# Patient Record
Sex: MALE | Race: BLACK OR AFRICAN AMERICAN | NOT HISPANIC OR LATINO | Employment: UNEMPLOYED | URBAN - METROPOLITAN AREA
[De-identification: names, ages, dates, MRNs, and addresses within clinical notes are randomized per-mention and may not be internally consistent; named-entity substitution may affect disease eponyms.]

---

## 2020-02-13 ENCOUNTER — HOSPITAL ENCOUNTER (EMERGENCY)
Facility: HOSPITAL | Age: 35
Discharge: HOME | End: 2020-02-13
Attending: EMERGENCY MEDICINE

## 2020-02-13 VITALS
HEIGHT: 67 IN | HEART RATE: 109 BPM | TEMPERATURE: 98.2 F | SYSTOLIC BLOOD PRESSURE: 142 MMHG | RESPIRATION RATE: 20 BRPM | DIASTOLIC BLOOD PRESSURE: 84 MMHG | OXYGEN SATURATION: 97 % | WEIGHT: 226.6 LBS | BODY MASS INDEX: 35.56 KG/M2

## 2020-02-13 DIAGNOSIS — K04.7 DENTAL ABSCESS: Primary | ICD-10-CM

## 2020-02-13 PROCEDURE — 99283 EMERGENCY DEPT VISIT LOW MDM: CPT

## 2020-02-13 PROCEDURE — 99283 EMERGENCY DEPT VISIT LOW MDM: CPT | Performed by: EMERGENCY MEDICINE

## 2020-02-13 RX ORDER — OXYCODONE HYDROCHLORIDE AND ACETAMINOPHEN 5; 325 MG/1; MG/1
1 TABLET ORAL EVERY 6 HOURS PRN
Qty: 10 TABLET | Refills: 0 | Status: SHIPPED | OUTPATIENT
Start: 2020-02-13 | End: 2020-02-23

## 2020-02-13 RX ORDER — NAPROXEN 500 MG/1
1 TABLET ORAL 2 TIMES DAILY
COMMUNITY
End: 2021-01-07 | Stop reason: HOSPADM

## 2020-02-13 RX ORDER — CLINDAMYCIN HYDROCHLORIDE 150 MG/1
300 CAPSULE ORAL ONCE
Status: COMPLETED | OUTPATIENT
Start: 2020-02-13 | End: 2020-02-13

## 2020-02-13 RX ORDER — OXYCODONE HYDROCHLORIDE AND ACETAMINOPHEN 5; 325 MG/1; MG/1
1 TABLET ORAL ONCE
Status: COMPLETED | OUTPATIENT
Start: 2020-02-13 | End: 2020-02-13

## 2020-02-13 RX ORDER — CLINDAMYCIN HYDROCHLORIDE 300 MG/1
300 CAPSULE ORAL EVERY 6 HOURS
Qty: 28 CAPSULE | Refills: 0 | Status: SHIPPED | OUTPATIENT
Start: 2020-02-13 | End: 2020-02-20

## 2020-02-13 RX ORDER — ALBUTEROL SULFATE 90 UG/1
2 AEROSOL, METERED RESPIRATORY (INHALATION) EVERY 4 HOURS PRN
COMMUNITY

## 2020-02-13 RX ADMIN — CLINDAMYCIN HYDROCHLORIDE 300 MG: 150 CAPSULE ORAL at 16:24

## 2020-02-13 RX ADMIN — OXYCODONE HYDROCHLORIDE AND ACETAMINOPHEN 1 TABLET: 5; 325 TABLET ORAL at 16:24

## 2020-02-13 NOTE — ED PROVIDER NOTES
History  Chief Complaint   Patient presents with    Dental Problem     States he started 2/10 with pain and swelling left upper tooth  States had fever and chills last night  Took Naprosyn this am , Tylenol this am       28 yo male c/o worsening left upper gum infection x 2 days  Has been using over the counter tylenol, naprosyn and saline syringe and ear drops  Pain radiates into left ear  Did have a fever last night  No vomiting or diarrhea   + smoker      History provided by:  Patient   used: No        Prior to Admission Medications   Prescriptions Last Dose Informant Patient Reported? Taking? albuterol (PROVENTIL HFA,VENTOLIN HFA) 90 mcg/act inhaler Unknown at Unknown time  Yes No   Sig: Inhale 2 puffs every 4 (four) hours as needed   naproxen (NAPROSYN) 500 mg tablet 2/13/2020 at 0900  Yes Yes   Sig: Take 1 tablet by mouth 2 (two) times a day      Facility-Administered Medications: None       Past Medical History:   Diagnosis Date    Asthma        Past Surgical History:   Procedure Laterality Date    JOINT REPLACEMENT Right     knee    KNEE SURGERY      SHOULDER SURGERY Right        History reviewed  No pertinent family history  I have reviewed and agree with the history as documented  Social History     Tobacco Use    Smoking status: Current Every Day Smoker     Packs/day: 0 50     Types: Cigarettes    Smokeless tobacco: Never Used   Substance Use Topics    Alcohol use: Not Currently    Drug use: Never       Review of Systems   Constitutional: Positive for fever  HENT: Positive for dental problem  Gastrointestinal: Negative for diarrhea and vomiting  Physical Exam  Physical Exam   Constitutional: He is oriented to person, place, and time  He appears well-developed and well-nourished  No distress  HENT:   Head: Normocephalic and atraumatic  Mild left upper cheek sts, + fluctuance seen and palpable upper gum above decayed tooth nubs    Area aspirated with 18 gauge needle and about 1 5 cc pus expelled   Eyes: No scleral icterus  Neck: Normal range of motion  Neck supple  Cardiovascular: Normal rate, regular rhythm and normal heart sounds  Pulmonary/Chest: Effort normal and breath sounds normal    Musculoskeletal: Normal range of motion  Neurological: He is alert and oriented to person, place, and time  He exhibits normal muscle tone  Skin: Skin is warm and dry  He is not diaphoretic  Psychiatric: He has a normal mood and affect  His behavior is normal    Nursing note and vitals reviewed  Vital Signs  ED Triage Vitals [02/13/20 1605]   Temperature Pulse Respirations Blood Pressure SpO2   98 2 °F (36 8 °C) (!) 109 20 142/84 97 %      Temp Source Heart Rate Source Patient Position - Orthostatic VS BP Location FiO2 (%)   Tympanic Monitor Sitting Left arm --      Pain Score       6           Vitals:    02/13/20 1605   BP: 142/84   Pulse: (!) 109   Patient Position - Orthostatic VS: Sitting         Visual Acuity      ED Medications  Medications   clindamycin (CLEOCIN) capsule 300 mg (300 mg Oral Given 2/13/20 1624)   oxyCODONE-acetaminophen (PERCOCET) 5-325 mg per tablet 1 tablet (1 tablet Oral Given 2/13/20 1624)       Diagnostic Studies  Results Reviewed     None                 No orders to display              Procedures  Procedures         ED Course                               MDM  Number of Diagnoses or Management Options  Dental abscess:   Diagnosis management comments: Pt  Advised he needs to do warm salt water rinses, oral abx, no smoking and see dentist asap          Disposition  Final diagnoses:   Dental abscess     Time reflects when diagnosis was documented in both MDM as applicable and the Disposition within this note     Time User Action Codes Description Comment    8/83/2561  3:34 PM Yessenia Aaron Add [E76 8] Dental abscess       ED Disposition     ED Disposition Condition Date/Time Comment    Discharge Stable u Feb 13, 2020  4:17 PM [facility] discharge to home/self care  Follow-up Information     Follow up With Specialties Details Why Contact Info    any dentist  Schedule an appointment as soon as possible for a visit  as soon as possible           Patient's Medications   Discharge Prescriptions    CLINDAMYCIN (CLEOCIN) 300 MG CAPSULE    Take 1 capsule (300 mg total) by mouth every 6 (six) hours for 7 days       Start Date: 2/13/2020 End Date: 2/20/2020       Order Dose: 300 mg       Quantity: 28 capsule    Refills: 0    OXYCODONE-ACETAMINOPHEN (PERCOCET) 5-325 MG PER TABLET    Take 1 tablet by mouth every 6 (six) hours as needed for moderate pain for up to 10 daysMax Daily Amount: 4 tablets       Start Date: 2/13/2020 End Date: 2/23/2020       Order Dose: 1 tablet       Quantity: 10 tablet    Refills: 0     No discharge procedures on file      PDMP Review     None          ED Provider  Electronically Signed by           Merari Buck MD  07/67/22 0175

## 2020-11-29 ENCOUNTER — HOSPITAL ENCOUNTER (INPATIENT)
Facility: HOSPITAL | Age: 35
LOS: 1 days | Discharge: HOME | DRG: 446 | End: 2020-12-02
Attending: EMERGENCY MEDICINE | Admitting: FAMILY MEDICINE
Payer: COMMERCIAL

## 2020-11-29 ENCOUNTER — APPOINTMENT (EMERGENCY)
Dept: RADIOLOGY | Facility: HOSPITAL | Age: 35
DRG: 446 | End: 2020-11-29
Payer: COMMERCIAL

## 2020-11-29 DIAGNOSIS — N39.0 UTI (URINARY TRACT INFECTION): Primary | ICD-10-CM

## 2020-11-29 DIAGNOSIS — F17.200 TOBACCO DEPENDENCE: ICD-10-CM

## 2020-11-29 DIAGNOSIS — N20.1 CALCULUS OF URETER: ICD-10-CM

## 2020-11-29 DIAGNOSIS — N20.1 URETERAL STONE: ICD-10-CM

## 2020-11-29 DIAGNOSIS — N20.0 KIDNEY STONE: ICD-10-CM

## 2020-11-29 PROBLEM — N30.90 CYSTITIS: Status: ACTIVE | Noted: 2020-11-29

## 2020-11-29 PROBLEM — E87.6 HYPOKALEMIA: Status: ACTIVE | Noted: 2020-11-29

## 2020-11-29 LAB
ALBUMIN SERPL BCP-MCNC: 3.7 G/DL (ref 3.5–5)
ALP SERPL-CCNC: 77 U/L (ref 46–116)
ALT SERPL W P-5'-P-CCNC: 31 U/L (ref 12–78)
ANION GAP SERPL CALCULATED.3IONS-SCNC: 10 MMOL/L (ref 4–13)
APTT PPP: 31 SECONDS (ref 23–37)
AST SERPL W P-5'-P-CCNC: 23 U/L (ref 5–45)
BACTERIA UR QL AUTO: ABNORMAL /HPF
BASOPHILS # BLD AUTO: 0.03 THOUSANDS/ΜL (ref 0–0.1)
BASOPHILS NFR BLD AUTO: 0 % (ref 0–1)
BILIRUB SERPL-MCNC: 0.2 MG/DL (ref 0.2–1)
BILIRUB UR QL STRIP: ABNORMAL
BUN SERPL-MCNC: 12 MG/DL (ref 5–25)
CALCIUM SERPL-MCNC: 8.3 MG/DL (ref 8.3–10.1)
CHLORIDE SERPL-SCNC: 105 MMOL/L (ref 100–108)
CLARITY UR: ABNORMAL
CO2 SERPL-SCNC: 27 MMOL/L (ref 21–32)
COLOR UR: ABNORMAL
CREAT SERPL-MCNC: 1.3 MG/DL (ref 0.6–1.3)
EOSINOPHIL # BLD AUTO: 0.23 THOUSAND/ΜL (ref 0–0.61)
EOSINOPHIL NFR BLD AUTO: 2 % (ref 0–6)
ERYTHROCYTE [DISTWIDTH] IN BLOOD BY AUTOMATED COUNT: 11.7 % (ref 11.6–15.1)
GFR SERPL CREATININE-BSD FRML MDRD: 81 ML/MIN/1.73SQ M
GLUCOSE SERPL-MCNC: 115 MG/DL (ref 65–140)
GLUCOSE UR STRIP-MCNC: NEGATIVE MG/DL
HCT VFR BLD AUTO: 40.9 % (ref 36.5–49.3)
HGB BLD-MCNC: 13.1 G/DL (ref 12–17)
HGB UR QL STRIP.AUTO: ABNORMAL
IMM GRANULOCYTES # BLD AUTO: 0.03 THOUSAND/UL (ref 0–0.2)
IMM GRANULOCYTES NFR BLD AUTO: 0 % (ref 0–2)
INR PPP: 1.03 (ref 0.84–1.19)
KETONES UR STRIP-MCNC: ABNORMAL MG/DL
LEUKOCYTE ESTERASE UR QL STRIP: NEGATIVE
LYMPHOCYTES # BLD AUTO: 3.75 THOUSANDS/ΜL (ref 0.6–4.47)
LYMPHOCYTES NFR BLD AUTO: 33 % (ref 14–44)
MCH RBC QN AUTO: 29.8 PG (ref 26.8–34.3)
MCHC RBC AUTO-ENTMCNC: 32 G/DL (ref 31.4–37.4)
MCV RBC AUTO: 93 FL (ref 82–98)
MONOCYTES # BLD AUTO: 0.96 THOUSAND/ΜL (ref 0.17–1.22)
MONOCYTES NFR BLD AUTO: 8 % (ref 4–12)
NEUTROPHILS # BLD AUTO: 6.53 THOUSANDS/ΜL (ref 1.85–7.62)
NEUTS SEG NFR BLD AUTO: 57 % (ref 43–75)
NITRITE UR QL STRIP: POSITIVE
NON-SQ EPI CELLS URNS QL MICRO: ABNORMAL /HPF
NRBC BLD AUTO-RTO: 0 /100 WBCS
PH UR STRIP.AUTO: 6.5 [PH]
PLATELET # BLD AUTO: 282 THOUSANDS/UL (ref 149–390)
PMV BLD AUTO: 10.5 FL (ref 8.9–12.7)
POTASSIUM SERPL-SCNC: 3.2 MMOL/L (ref 3.5–5.3)
PROT SERPL-MCNC: 7.1 G/DL (ref 6.4–8.2)
PROT UR STRIP-MCNC: ABNORMAL MG/DL
PROTHROMBIN TIME: 13.4 SECONDS (ref 11.6–14.5)
RBC # BLD AUTO: 4.39 MILLION/UL (ref 3.88–5.62)
RBC #/AREA URNS AUTO: ABNORMAL /HPF
SODIUM SERPL-SCNC: 142 MMOL/L (ref 136–145)
SP GR UR STRIP.AUTO: 1.02 (ref 1–1.03)
UROBILINOGEN UR QL STRIP.AUTO: 1 E.U./DL
WBC # BLD AUTO: 11.53 THOUSAND/UL (ref 4.31–10.16)
WBC #/AREA URNS AUTO: ABNORMAL /HPF

## 2020-11-29 PROCEDURE — 36415 COLL VENOUS BLD VENIPUNCTURE: CPT | Performed by: EMERGENCY MEDICINE

## 2020-11-29 PROCEDURE — 96375 TX/PRO/DX INJ NEW DRUG ADDON: CPT

## 2020-11-29 PROCEDURE — G1004 CDSM NDSC: HCPCS

## 2020-11-29 PROCEDURE — 74177 CT ABD & PELVIS W/CONTRAST: CPT

## 2020-11-29 PROCEDURE — 99285 EMERGENCY DEPT VISIT HI MDM: CPT | Performed by: EMERGENCY MEDICINE

## 2020-11-29 PROCEDURE — 85730 THROMBOPLASTIN TIME PARTIAL: CPT | Performed by: EMERGENCY MEDICINE

## 2020-11-29 PROCEDURE — 85610 PROTHROMBIN TIME: CPT | Performed by: EMERGENCY MEDICINE

## 2020-11-29 PROCEDURE — 99285 EMERGENCY DEPT VISIT HI MDM: CPT

## 2020-11-29 PROCEDURE — 80053 COMPREHEN METABOLIC PANEL: CPT | Performed by: EMERGENCY MEDICINE

## 2020-11-29 PROCEDURE — 96376 TX/PRO/DX INJ SAME DRUG ADON: CPT

## 2020-11-29 PROCEDURE — 81001 URINALYSIS AUTO W/SCOPE: CPT | Performed by: EMERGENCY MEDICINE

## 2020-11-29 PROCEDURE — 99220 PR INITIAL OBSERVATION CARE/DAY 70 MINUTES: CPT | Performed by: NURSE PRACTITIONER

## 2020-11-29 PROCEDURE — 96374 THER/PROPH/DIAG INJ IV PUSH: CPT

## 2020-11-29 PROCEDURE — 85025 COMPLETE CBC W/AUTO DIFF WBC: CPT | Performed by: EMERGENCY MEDICINE

## 2020-11-29 PROCEDURE — 0241U HB NFCT DS VIR RESP RNA 4 TRGT: CPT | Performed by: NURSE PRACTITIONER

## 2020-11-29 PROCEDURE — 96361 HYDRATE IV INFUSION ADD-ON: CPT

## 2020-11-29 PROCEDURE — 87086 URINE CULTURE/COLONY COUNT: CPT | Performed by: EMERGENCY MEDICINE

## 2020-11-29 RX ORDER — HYDROMORPHONE HCL/PF 1 MG/ML
0.5 SYRINGE (ML) INJECTION ONCE
Status: COMPLETED | OUTPATIENT
Start: 2020-11-29 | End: 2020-11-29

## 2020-11-29 RX ORDER — CEFTRIAXONE 1 G/50ML
1000 INJECTION, SOLUTION INTRAVENOUS ONCE
Status: COMPLETED | OUTPATIENT
Start: 2020-11-29 | End: 2020-11-30

## 2020-11-29 RX ORDER — KETOROLAC TROMETHAMINE 30 MG/ML
15 INJECTION, SOLUTION INTRAMUSCULAR; INTRAVENOUS ONCE
Status: COMPLETED | OUTPATIENT
Start: 2020-11-29 | End: 2020-11-29

## 2020-11-29 RX ORDER — HYDROMORPHONE HCL/PF 1 MG/ML
1 SYRINGE (ML) INJECTION ONCE
Status: COMPLETED | OUTPATIENT
Start: 2020-11-29 | End: 2020-11-29

## 2020-11-29 RX ADMIN — IOHEXOL 100 ML: 350 INJECTION, SOLUTION INTRAVENOUS at 22:01

## 2020-11-29 RX ADMIN — HYDROMORPHONE HYDROCHLORIDE 0.5 MG: 1 INJECTION, SOLUTION INTRAMUSCULAR; INTRAVENOUS; SUBCUTANEOUS at 23:35

## 2020-11-29 RX ADMIN — CEFTRIAXONE 1000 MG: 1 INJECTION, SOLUTION INTRAVENOUS at 23:30

## 2020-11-29 RX ADMIN — KETOROLAC TROMETHAMINE 15 MG: 30 INJECTION, SOLUTION INTRAMUSCULAR at 21:18

## 2020-11-29 RX ADMIN — SODIUM CHLORIDE 1000 ML: 0.9 INJECTION, SOLUTION INTRAVENOUS at 21:14

## 2020-11-29 RX ADMIN — HYDROMORPHONE HYDROCHLORIDE 1 MG: 1 INJECTION, SOLUTION INTRAMUSCULAR; INTRAVENOUS; SUBCUTANEOUS at 21:46

## 2020-11-29 RX ADMIN — HYDROMORPHONE HYDROCHLORIDE 0.5 MG: 1 INJECTION, SOLUTION INTRAMUSCULAR; INTRAVENOUS; SUBCUTANEOUS at 22:16

## 2020-11-30 PROBLEM — F17.200 TOBACCO DEPENDENCE: Status: ACTIVE | Noted: 2020-11-30

## 2020-11-30 LAB
ALBUMIN SERPL BCP-MCNC: 3.1 G/DL (ref 3.5–5)
ALP SERPL-CCNC: 66 U/L (ref 46–116)
ALT SERPL W P-5'-P-CCNC: 25 U/L (ref 12–78)
ANION GAP SERPL CALCULATED.3IONS-SCNC: 6 MMOL/L (ref 4–13)
AST SERPL W P-5'-P-CCNC: 16 U/L (ref 5–45)
BILIRUB SERPL-MCNC: 0.3 MG/DL (ref 0.2–1)
BUN SERPL-MCNC: 11 MG/DL (ref 5–25)
CALCIUM ALBUM COR SERPL-MCNC: 8.6 MG/DL (ref 8.3–10.1)
CALCIUM SERPL-MCNC: 7.9 MG/DL (ref 8.3–10.1)
CHLORIDE SERPL-SCNC: 108 MMOL/L (ref 100–108)
CO2 SERPL-SCNC: 27 MMOL/L (ref 21–32)
CREAT SERPL-MCNC: 1.02 MG/DL (ref 0.6–1.3)
ERYTHROCYTE [DISTWIDTH] IN BLOOD BY AUTOMATED COUNT: 11.9 % (ref 11.6–15.1)
FLUAV RNA RESP QL NAA+PROBE: NEGATIVE
FLUBV RNA RESP QL NAA+PROBE: NEGATIVE
GFR SERPL CREATININE-BSD FRML MDRD: 109 ML/MIN/1.73SQ M
GLUCOSE SERPL-MCNC: 96 MG/DL (ref 65–140)
HCT VFR BLD AUTO: 38.6 % (ref 36.5–49.3)
HGB BLD-MCNC: 11.9 G/DL (ref 12–17)
MCH RBC QN AUTO: 29.4 PG (ref 26.8–34.3)
MCHC RBC AUTO-ENTMCNC: 30.8 G/DL (ref 31.4–37.4)
MCV RBC AUTO: 95 FL (ref 82–98)
PLATELET # BLD AUTO: 256 THOUSANDS/UL (ref 149–390)
PMV BLD AUTO: 11 FL (ref 8.9–12.7)
POTASSIUM SERPL-SCNC: 3.4 MMOL/L (ref 3.5–5.3)
PROT SERPL-MCNC: 6.4 G/DL (ref 6.4–8.2)
RBC # BLD AUTO: 4.05 MILLION/UL (ref 3.88–5.62)
RSV RNA RESP QL NAA+PROBE: NEGATIVE
SARS-COV-2 RNA RESP QL NAA+PROBE: NEGATIVE
SODIUM SERPL-SCNC: 141 MMOL/L (ref 136–145)
WBC # BLD AUTO: 10.38 THOUSAND/UL (ref 4.31–10.16)

## 2020-11-30 PROCEDURE — 94640 AIRWAY INHALATION TREATMENT: CPT

## 2020-11-30 PROCEDURE — 99226 PR SBSQ OBSERVATION CARE/DAY 35 MINUTES: CPT | Performed by: INTERNAL MEDICINE

## 2020-11-30 PROCEDURE — 90686 IIV4 VACC NO PRSV 0.5 ML IM: CPT | Performed by: INTERNAL MEDICINE

## 2020-11-30 PROCEDURE — 94760 N-INVAS EAR/PLS OXIMETRY 1: CPT

## 2020-11-30 PROCEDURE — 90471 IMMUNIZATION ADMIN: CPT | Performed by: INTERNAL MEDICINE

## 2020-11-30 PROCEDURE — 85027 COMPLETE CBC AUTOMATED: CPT | Performed by: NURSE PRACTITIONER

## 2020-11-30 PROCEDURE — 94664 DEMO&/EVAL PT USE INHALER: CPT

## 2020-11-30 PROCEDURE — 80053 COMPREHEN METABOLIC PANEL: CPT | Performed by: NURSE PRACTITIONER

## 2020-11-30 RX ORDER — ONDANSETRON 2 MG/ML
4 INJECTION INTRAMUSCULAR; INTRAVENOUS EVERY 6 HOURS PRN
Status: DISCONTINUED | OUTPATIENT
Start: 2020-11-30 | End: 2020-12-02 | Stop reason: HOSPADM

## 2020-11-30 RX ORDER — ACETAMINOPHEN 325 MG/1
650 TABLET ORAL EVERY 6 HOURS PRN
Status: DISCONTINUED | OUTPATIENT
Start: 2020-11-30 | End: 2020-12-02 | Stop reason: HOSPADM

## 2020-11-30 RX ORDER — HYDROMORPHONE HCL/PF 1 MG/ML
0.5 SYRINGE (ML) INJECTION EVERY 6 HOURS PRN
Status: DISCONTINUED | OUTPATIENT
Start: 2020-11-30 | End: 2020-12-02 | Stop reason: HOSPADM

## 2020-11-30 RX ORDER — NICOTINE 21 MG/24HR
1 PATCH, TRANSDERMAL 24 HOURS TRANSDERMAL DAILY
Status: DISCONTINUED | OUTPATIENT
Start: 2020-11-30 | End: 2020-12-02 | Stop reason: HOSPADM

## 2020-11-30 RX ORDER — HYDROMORPHONE HCL/PF 1 MG/ML
0.5 SYRINGE (ML) INJECTION ONCE
Status: COMPLETED | OUTPATIENT
Start: 2020-11-30 | End: 2020-11-30

## 2020-11-30 RX ORDER — DIPHENHYDRAMINE HCL 25 MG
25 TABLET ORAL EVERY 6 HOURS PRN
Status: DISCONTINUED | OUTPATIENT
Start: 2020-11-30 | End: 2020-12-02 | Stop reason: HOSPADM

## 2020-11-30 RX ORDER — HYDROMORPHONE HCL/PF 1 MG/ML
0.2 SYRINGE (ML) INJECTION EVERY 6 HOURS PRN
Status: DISCONTINUED | OUTPATIENT
Start: 2020-11-30 | End: 2020-11-30

## 2020-11-30 RX ORDER — POTASSIUM CHLORIDE 20 MEQ/1
40 TABLET, EXTENDED RELEASE ORAL ONCE
Status: COMPLETED | OUTPATIENT
Start: 2020-11-30 | End: 2020-11-30

## 2020-11-30 RX ORDER — KETOROLAC TROMETHAMINE 30 MG/ML
15 INJECTION, SOLUTION INTRAMUSCULAR; INTRAVENOUS EVERY 6 HOURS PRN
Status: DISPENSED | OUTPATIENT
Start: 2020-11-30 | End: 2020-12-02

## 2020-11-30 RX ORDER — HYDROMORPHONE HCL/PF 1 MG/ML
0.5 SYRINGE (ML) INJECTION
Status: DISCONTINUED | OUTPATIENT
Start: 2020-11-30 | End: 2020-12-02 | Stop reason: HOSPADM

## 2020-11-30 RX ORDER — SODIUM CHLORIDE 9 MG/ML
100 INJECTION, SOLUTION INTRAVENOUS CONTINUOUS
Status: DISCONTINUED | OUTPATIENT
Start: 2020-11-30 | End: 2020-12-02 | Stop reason: HOSPADM

## 2020-11-30 RX ORDER — TAMSULOSIN HYDROCHLORIDE 0.4 MG/1
0.4 CAPSULE ORAL
Status: DISCONTINUED | OUTPATIENT
Start: 2020-11-30 | End: 2020-12-02 | Stop reason: HOSPADM

## 2020-11-30 RX ORDER — ALBUTEROL SULFATE 2.5 MG/3ML
2.5 SOLUTION RESPIRATORY (INHALATION) EVERY 6 HOURS PRN
Status: DISCONTINUED | OUTPATIENT
Start: 2020-11-30 | End: 2020-12-02 | Stop reason: HOSPADM

## 2020-11-30 RX ORDER — SODIUM CHLORIDE 9 MG/ML
125 INJECTION, SOLUTION INTRAVENOUS CONTINUOUS
Status: DISCONTINUED | OUTPATIENT
Start: 2020-11-30 | End: 2020-11-30

## 2020-11-30 RX ORDER — CEFTRIAXONE 1 G/50ML
1000 INJECTION, SOLUTION INTRAVENOUS EVERY 24 HOURS
Status: DISCONTINUED | OUTPATIENT
Start: 2020-11-30 | End: 2020-11-30

## 2020-11-30 RX ADMIN — Medication 1 PATCH: at 10:45

## 2020-11-30 RX ADMIN — SODIUM CHLORIDE 200 ML/HR: 0.9 INJECTION, SOLUTION INTRAVENOUS at 16:44

## 2020-11-30 RX ADMIN — HYDROMORPHONE HYDROCHLORIDE 0.5 MG: 1 INJECTION, SOLUTION INTRAMUSCULAR; INTRAVENOUS; SUBCUTANEOUS at 13:36

## 2020-11-30 RX ADMIN — SODIUM CHLORIDE 200 ML/HR: 0.9 INJECTION, SOLUTION INTRAVENOUS at 20:27

## 2020-11-30 RX ADMIN — DIPHENHYDRAMINE HYDROCHLORIDE 25 MG: 25 TABLET ORAL at 20:31

## 2020-11-30 RX ADMIN — TAMSULOSIN HYDROCHLORIDE 0.4 MG: 0.4 CAPSULE ORAL at 16:39

## 2020-11-30 RX ADMIN — INFLUENZA VIRUS VACCINE 0.5 ML: 15; 15; 15; 15 SUSPENSION INTRAMUSCULAR at 11:14

## 2020-11-30 RX ADMIN — DIPHENHYDRAMINE HYDROCHLORIDE 25 MG: 25 TABLET ORAL at 01:19

## 2020-11-30 RX ADMIN — HYDROMORPHONE HYDROCHLORIDE 0.5 MG: 1 INJECTION, SOLUTION INTRAMUSCULAR; INTRAVENOUS; SUBCUTANEOUS at 20:21

## 2020-11-30 RX ADMIN — DIPHENHYDRAMINE HYDROCHLORIDE 25 MG: 25 TABLET ORAL at 07:46

## 2020-11-30 RX ADMIN — POTASSIUM CHLORIDE 40 MEQ: 1500 TABLET, EXTENDED RELEASE ORAL at 20:21

## 2020-11-30 RX ADMIN — HYDROMORPHONE HYDROCHLORIDE 0.2 MG: 1 INJECTION, SOLUTION INTRAMUSCULAR; INTRAVENOUS; SUBCUTANEOUS at 09:50

## 2020-11-30 RX ADMIN — HYDROMORPHONE HYDROCHLORIDE 0.2 MG: 1 INJECTION, SOLUTION INTRAMUSCULAR; INTRAVENOUS; SUBCUTANEOUS at 03:35

## 2020-11-30 RX ADMIN — KETOROLAC TROMETHAMINE 15 MG: 30 INJECTION, SOLUTION INTRAMUSCULAR at 11:11

## 2020-11-30 RX ADMIN — SODIUM CHLORIDE 125 ML/HR: 0.9 INJECTION, SOLUTION INTRAVENOUS at 01:37

## 2020-11-30 RX ADMIN — POTASSIUM CHLORIDE 40 MEQ: 1500 TABLET, EXTENDED RELEASE ORAL at 01:19

## 2020-11-30 RX ADMIN — HYDROMORPHONE HYDROCHLORIDE 0.5 MG: 1 INJECTION, SOLUTION INTRAMUSCULAR; INTRAVENOUS; SUBCUTANEOUS at 16:39

## 2020-11-30 RX ADMIN — MORPHINE SULFATE 2 MG: 2 INJECTION, SOLUTION INTRAMUSCULAR; INTRAVENOUS at 07:46

## 2020-11-30 RX ADMIN — HYDROMORPHONE HYDROCHLORIDE 0.5 MG: 1 INJECTION, SOLUTION INTRAMUSCULAR; INTRAVENOUS; SUBCUTANEOUS at 04:27

## 2020-11-30 RX ADMIN — HYDROMORPHONE HYDROCHLORIDE 0.5 MG: 1 INJECTION, SOLUTION INTRAMUSCULAR; INTRAVENOUS; SUBCUTANEOUS at 23:28

## 2020-11-30 RX ADMIN — SODIUM CHLORIDE 200 ML/HR: 0.9 INJECTION, SOLUTION INTRAVENOUS at 10:44

## 2020-11-30 RX ADMIN — ALBUTEROL SULFATE 2.5 MG: 2.5 SOLUTION RESPIRATORY (INHALATION) at 21:06

## 2020-11-30 RX ADMIN — MORPHINE SULFATE 2 MG: 2 INJECTION, SOLUTION INTRAMUSCULAR; INTRAVENOUS at 03:00

## 2020-11-30 NOTE — UTILIZATION REVIEW
Initial Clinical Review  PATIENT WAS OBSERVATION STATUS 11/29/20 CONVERTED TO INPATIENT ADMISSiON 12/1/20 AS NEEDING CONTINUED STAYING FOR FLANK PAIN,PLAN FOR SURGERY IN AM ,    Admission: Date/Time/Statement:   Admission Orders (From admission, onward)     Ordered        12/01/20 1419  Inpatient Admission  Once                   Orders Placed This Encounter   Procedures    Inpatient Admission     Standing Status:   Standing     Number of Occurrences:   1     Order Specific Question:   Admitting Physician     Answer:   Chaz Moy [[id number]]     Order Specific Question:   Level of Care     Answer:   Med Surg [16]     Order Specific Question:   Estimated length of stay     Answer:   More than 2 Midnights     Order Specific Question:   Certification     Answer:   I certify that inpatient services are medically necessary for this patient for a duration of greater than two midnights  See H&P and MD Progress Notes for additional information about the patient's course of treatment  ED Arrival Information     Expected Arrival Acuity Means of Arrival Escorted By Service Admission Type    - 11/29/2020 20:34 Urgent Walk-In Self Hospitalist Urgent    Arrival Complaint    possible hernia        Chief Complaint   Patient presents with    Abdominal Pain     c/o low R groin/abd pain since thanksgiving  having hematuria today     Assessment/Plan: 38 y o  male with a PMH of asthma who presents with complaints of abdominal pain  Patient states that 3 days ago he developed right lower quadrant pain radiating into his groin  Last night he some dominoes and subsequently had some nausea and vomiting  He has had no fevers or chills  For the past 24 hours he has had hematuria and dysuria  No history of kidney stones  Pain worsened today prompting patient to come to the emergency department  In the ER patient was found to have a mild elevation in his white blood cell count as well as hypokalemia    CT revealed 3 mm obstructing stone in the distal right ureter with mild hydroureternephrosis proximal   Urinalysis revealed blood, nitrates, bacteria and CT was consistent with cystitis  Case was discussed with Urology and patient was recommended for medical admission for possible cystoscopy in the morning  UROLOGY  CONSULT  Assessment/ Plan:  Right 3 mm stone  98% chance passing  Start flomax   4  Strain  kub  Possible  OR on weds  Unable to pass pain    12/1/20 CONVERTED TO INPATIENT ADMISSION CONTINUES WITH FLANK PAIN IV DILAUDID X6 IN 24HR, IV TORADOL , CONTINUED IVF @200HR  ED Triage Vitals   Temperature Pulse Respirations Blood Pressure SpO2   11/29/20 2043 11/29/20 2045 11/29/20 2043 11/29/20 2045 11/29/20 2045   98 9 °F (37 2 °C) 103 20 166/86 99 %      Temp Source Heart Rate Source Patient Position - Orthostatic VS BP Location FiO2 (%)   11/29/20 2043 11/29/20 2045 11/29/20 2043 11/29/20 2043 --   Tympanic Monitor Sitting Right arm       Pain Score       11/29/20 2043       8          Wt Readings from Last 1 Encounters:   11/29/20 101 kg (222 lb)     Additional Vital Signs:   Pertinent Labs/Diagnostic Test Results:   Results from last 7 days   Lab Units 11/29/20  2345   SARS-COV-2  Negative     Results from last 7 days   Lab Units 12/01/20  1058 11/30/20  0502 11/29/20 2113   WBC Thousand/uL 8 32 10 38* 11 53*   HEMOGLOBIN g/dL 13 5 11 9* 13 1   HEMATOCRIT % 42 0 38 6 40 9   PLATELETS Thousands/uL 269 256 282   NEUTROS ABS Thousands/µL 5 62  --  6 53     Results from last 7 days   Lab Units 12/01/20  1058 11/30/20  0502 11/29/20  2113   SODIUM mmol/L 137 141 142   POTASSIUM mmol/L 4 2 3 4* 3 2*   CHLORIDE mmol/L 103 108 105   CO2 mmol/L 28 27 27   ANION GAP mmol/L 6 6 10   BUN mg/dL 9 11 12   CREATININE mg/dL 1 06 1 02 1 30   EGFR ml/min/1 73sq m 104 109 81   CALCIUM mg/dL 8 5 7 9* 8 3   MAGNESIUM mg/dL 1 8  --   --      Results from last 7 days   Lab Units 11/30/20  0502 11/29/20  2113   AST U/L 16 23   ALT U/L 25 31   ALK PHOS U/L 66 77   TOTAL PROTEIN g/dL 6 4 7 1   ALBUMIN g/dL 3 1* 3 7   TOTAL BILIRUBIN mg/dL 0 30 0 20     Results from last 7 days   Lab Units 12/01/20  1058 11/30/20  0502 11/29/20  2113   GLUCOSE RANDOM mg/dL 102 96 115     Results from last 7 days   Lab Units 11/29/20  2113   PROTIME seconds 13 4   INR  1 03   PTT seconds 31     Results from last 7 days   Lab Units 11/29/20  2148   CLARITY UA  Other   COLOR UA  Red   SPEC GRAV UA  1 025   PH UA  6 5   GLUCOSE UA mg/dl Negative   KETONES UA mg/dl Trace*   BLOOD UA  Large*   PROTEIN UA mg/dl 100 (2+)*   NITRITE UA  Positive*   BILIRUBIN UA  Interference- unable to analyze*   UROBILINOGEN UA E U /dl 1 0   LEUKOCYTES UA  Negative   WBC UA /hpf Field obscured, unable to enumerate*   RBC UA /hpf Innumerable*   BACTERIA UA /hpf Field obscured, unable to enumerate*   EPITHELIAL CELLS WET PREP /hpf Field obscured, unable to enumerate*     Results from last 7 days   Lab Units 11/29/20  2345   INFLUENZA A PCR  Negative   INFLUENZA B PCR  Negative   RSV PCR  Negative     ED Treatment:   Medication Administration from 11/29/2020 2033 to 11/30/2020 0038       Date/Time Order Dose Route Action Action by Comments     11/29/2020 2118 ketorolac (TORADOL) injection 15 mg 15 mg Intravenous Given Mitch Card RN      11/29/2020 2202 sodium chloride 0 9 % bolus 1,000 mL 0 mL Intravenous Stopped Mitch Card RN      11/29/2020 2114 sodium chloride 0 9 % bolus 1,000 mL 1,000 mL [facility]ous [facility] [address] [place], [place] [address], [address] [place] [address]      11/29/2020 2146 HYDROmorphone (DILAUDID) injection 1 mg 1 mg Intravenous Given Mitch Card RN      11/29/2020 2201 iohexol (OMNIPAQUE) 350 MG/ML injection (SINGLE-DOSE) 100 mL 100 mL Intravenous Given Girma Stephie Cardonakins      11/29/2020 2216 HYDROmorphone (DILAUDID) injection 0 5 mg 0 5 mg Intravenous Given Mitch Card RN      11/30/2020 0024 cefTRIAXone (ROCEPHIN) IVPB (premix in dextrose) 1,000 mg 50 mL 0 mg Intravenous Stopped Gladys iLn RN      11/29/2020 2330 cefTRIAXone (ROCEPHIN) IVPB (premix in dextrose) 1,000 mg 50 mL 1,000 mg Intravenous New Bag Cindy Luna RN      11/29/2020 2335 HYDROmorphone (DILAUDID) injection 0 5 mg 0 5 mg Intravenous Given Angelita Billingsley RN         Past Medical History:   Diagnosis Date    Asthma      Present on Admission:   Ureteral stone      Admitting Diagnosis: Kidney stone [N20 0]  UTI (urinary tract infection) [N39 0]  Abdominal pain [R10 9]  Age/Sex: 38 y o  male  Admission Orders:  Scheduled Medications:  nicotine, 1 patch, Transdermal, Daily  tamsulosin, 0 4 mg, Oral, Daily With Dinner      Continuous IV Infusions:  sodium chloride, 200 mL/hr, Intravenous, Continuous      PRN Meds:  acetaminophen, 650 mg, Oral, Q6H PRN  albuterol, 2 5 mg, Nebulization, Q6H PRN  diphenhydrAMINE, 25 mg, Oral, Q6H PRN  HYDROmorphone, 0 5 mg, Intravenous, Q6H PRN  HYDROmorphone, 0 5 mg, Intravenous, Q3H PRN  ketorolac, 15 mg, Intravenous, Q6H PRN  ondansetron, 4 mg, Intravenous, Q6H PRN        IP CONSULT TO UROLOGY    Network Utilization Review Department  [email or web address]  ATTENTION: Please call with any questions or concerns to [phone] and carefully listen to the prompts so that you are directed to the right person  All voicemails are confidential   Yareli Jackson all requests for admission clinical reviews, approved or denied determinations and any other requests to dedicated fax number below belonging to the campus where the patient is receiving treatment   List of dedicated fax numbers for the Facilities:  FACILITY NAME UR FAX NUMBER   ADMISSION DENIALS (Administrative/Medical Necessity) [phone]   [address] (Maternity/NICU/Pediatrics) [phone]   Juliana Mcgrath [phone]   Isaak Thompson [phone]   [address] [place] [address] [phone]   [facility] [phone]   [address] [phone]

## 2020-11-30 NOTE — ASSESSMENT & PLAN NOTE
Patient presented to the ED with complaints of right sided abdominal/groin pain  He had some nausea and vomiting last night  He reports dysuria, hematuria  He has no fevers or chills  CT revealed right sided nephrolithiasis, 3 mm obstructing stone in the distal right ureter with mild right hydroureteronephrosis proximal to this       Continue IV fluids, antiemetics, pain medication   Strain all urine   Urology consultation   NPO after midnight   Flomax

## 2020-11-30 NOTE — ED NOTES
Patient transported to [address] [place] [address], [address] [place] [address]  11/29/20 3579

## 2020-11-30 NOTE — CONSULTS
H&P Exam - Urology       Patient: Jolynn Cottrell   : 1985 Sex: male   MRN: [id number]     CSN: [id number]      History of Present Illness   HPI:  Jolynn Cottrell is a 38 y o  male who presents with 3mm right distal ureteral stone presenting late last night with right flank pain starting on  patient seen at the bedside this time stating that this is 1st attack of colic and still notes pain of at least a 6/10        Review of Systems:   Constitutional:  Negative for activity change, fever, chills and diaphoresis  HENT: Negative for hearing loss and trouble swallowing  Eyes: Negative for itching and visual disturbance  Respiratory: Negative for chest tightness and shortness of breath  Cardiovascular: Negative for chest pain, edema  Gastrointestinal: Negative for abdominal distention, na abdominal pain, constipation, diarrhea, Nausea and vomiting  Genitourinary: Negative for decreased urine volume, difficulty urinating, dysuria, enuresis, frequency, hematuria and urgency  Musculoskeletal: Negative for gait problem and myalgias  Neurological: Negative for dizziness and headaches  Hematological: Does not bruise/bleed easily  Historical Information   Past Medical History:   Diagnosis Date    Asthma      Past Surgical History:   Procedure Laterality Date    JOINT REPLACEMENT Right     knee    KNEE SURGERY      SHOULDER SURGERY Right      Social History   Social History     Substance and Sexual Activity   Alcohol Use Not Currently     Social History     Substance and Sexual Activity   Drug Use Never     Social History     Tobacco Use   Smoking Status Current Every Day Smoker    Packs/day: 0 50    Types: Cigarettes   Smokeless Tobacco Never Used     Family History: History reviewed  No pertinent family history      Meds/Allergies   Medications Prior to Admission   Medication    albuterol (PROVENTIL HFA,VENTOLIN HFA) 90 mcg/act inhaler    naproxen (NAPROSYN) 500 mg tablet     Allergies   Allergen Reactions    Latex     Levofloxacin     Penicillins     Rocephin [Ceftriaxone]     Shellfish-Derived Products     Coconut Oil Rash       Objective   Vitals: /62   Pulse 65   Temp 98 °F (36 7 °C)   Resp 18   Wt 101 kg (222 lb)   SpO2 99%   BMI 34 77 kg/m²     Physical Exam:  General Alert awake   Normocephalic atraumatic PERRLA  Lungs clear bilaterally  Cardiac normal S1 normal S2  Abdomen soft, flank pain  Extremities no edema    I/O last 24 hours: In: 1050 [IV Piggyback:1050]  Out: -     Invasive Devices     Peripheral Intravenous Line            Peripheral IV 11/29/20 Left Hand less than 1 day                    Lab Results: CBC:   Lab Results   Component Value Date    WBC 10 38 (H) 11/30/2020    HGB 11 9 (L) 11/30/2020    HCT 38 6 11/30/2020    MCV 95 11/30/2020     11/30/2020    MCH 29 4 11/30/2020    MCHC 30 8 (L) 11/30/2020    RDW 11 9 11/30/2020    MPV 11 0 11/30/2020    NRBC 0 11/29/2020     CMP:   Lab Results   Component Value Date     11/30/2020    CO2 27 11/30/2020    BUN 11 11/30/2020    CREATININE 1 02 11/30/2020    CALCIUM 7 9 (L) 11/30/2020    AST 16 11/30/2020    ALT 25 11/30/2020    ALKPHOS 66 11/30/2020    EGFR 109 11/30/2020     Urinalysis:   Lab Results   Component Value Date    COLORU Red 11/29/2020    CLARITYU Other 11/29/2020    SPECGRAV 1 025 11/29/2020    PHUR 6 5 11/29/2020    LEUKOCYTESUR Negative 11/29/2020    NITRITE Positive (A) 11/29/2020    GLUCOSEU Negative 11/29/2020    KETONESU Trace (A) 11/29/2020    BILIRUBINUR Interference- unable to analyze (A) 11/29/2020    BLOODU Large (A) 11/29/2020     Urine Culture: No results found for: URINECX  PSA: No results found for: PSA        Assessment/ Plan:  Right 3 mm stone  98% chance passing  Start flomax   4  Strain  kub  Possible  OR on weds  Unable to pass or significant pain  Unable to put on schedule for tomorrow      Renetta Rodrigez MD

## 2020-11-30 NOTE — H&P
H&P- [facility] 1985, 38 y o  male MRN: [id number]    Unit/Bed#: ED 10 Encounter: [id number]    Primary Care Provider: No primary care provider on file  Date and time admitted to hospital: 11/29/2020  8:48 PM    * Ureteral stone  Assessment & Plan  Patient presented to the ED with complaints of right sided abdominal/groin pain  He had some nausea and vomiting last night  He reports dysuria, hematuria  He has no fevers or chills  CT revealed right sided nephrolithiasis, 3 mm obstructing stone in the distal right ureter with mild right hydroureteronephrosis proximal to this   Continue IV fluids, antiemetics, pain medication   Strain all urine   Urology consultation   NPO after midnight   Flomax    Tobacco dependence  Assessment & Plan  Continue nicoderm patch    Cystitis  Assessment & Plan  Patient reports abdominal/groin pain for three days with some hematuria/dysuria beginning yesterday  CT revealed mild circumferential bladder wall thickening, concern for superimposed cystitis  Urinalysis revealed nitrites, bacteria, blood  · Continue rocephin  · Urine cultures pending    Hypokalemia  Assessment & Plan  Potassium 3 2, did have some intractable nausea and vomiting last night  Replaced  Repeat in AM    VTE Prophylaxis: Pharmacologic VTE Prophylaxis contraindicated due to hematuria  / sequential compression device   Code Status: No Order    Anticipated Length of Stay:  Patient will be admitted on an Observation basis with an anticipated length of stay of less than 2 midnights  Justification for Hospital Stay: ureteral stone, cystitis     Total Time for Visit, including Counseling / Coordination of Care: 15 minutes  Greater than 50% of this total time spent on direct patient counseling and coordination of care      Chief Complaint:   Abdominal Pain (c/o low R groin/abd pain since thanksgiving  having hematuria today)      History of Present Illness:    Clayton Graham is a 38 y o  male with a PMH of asthma who presents with complaints of abdominal pain  Patient states that 3 days ago he developed right lower quadrant pain radiating into his groin  Last night he some dominoes and subsequently had some nausea and vomiting  He has had no fevers or chills  For the past 24 hours he has had hematuria and dysuria  No history of kidney stones  Pain worsened today prompting patient to come to the emergency department  In the ER patient was found to have a mild elevation in his white blood cell count as well as hypokalemia  CT revealed 3 mm obstructing stone in the distal right ureter with mild hydroureternephrosis proximal   Urinalysis revealed blood, nitrates, bacteria and CT was consistent with cystitis  Case was discussed with Urology and patient was recommended for medical admission for possible cystoscopy in the morning  Review of Systems:    Review of Systems   Constitutional: Negative  HENT: Negative  Eyes: Negative  Respiratory: Negative  Cardiovascular: Negative  Gastrointestinal: Positive for abdominal pain, nausea and vomiting  Endocrine: Negative  Genitourinary: Positive for dysuria  Musculoskeletal: Negative  Skin: Negative  Allergic/Immunologic: Negative  Neurological: Negative  Hematological: Negative  Psychiatric/Behavioral: Negative  Past Medical and Surgical History:     Past Medical History:   Diagnosis Date    Asthma        Past Surgical History:   Procedure Laterality Date    JOINT REPLACEMENT Right     knee    KNEE SURGERY      SHOULDER SURGERY Right        Meds/Allergies:    Prior to Admission medications    Medication Sig Start Date End Date Taking?  Authorizing Provider   albuterol (PROVENTIL HFA,VENTOLIN HFA) 90 mcg/act inhaler Inhale 2 puffs every 4 (four) hours as needed    Historical Provider, MD   naproxen (NAPROSYN) 500 mg tablet Take 1 tablet by mouth 2 (two) times a day    Historical Provider, MD Allergies: Allergies   Allergen Reactions    Latex     Levofloxacin     Penicillins     Shellfish-Derived Products     Coconut Oil Rash       Social History:     Marital Status: Unknown   Substance Use History:   Social History     Substance and Sexual Activity   Alcohol Use Not Currently     Social History     Tobacco Use   Smoking Status Current Every Day Smoker    Packs/day: 0 50    Types: Cigarettes   Smokeless Tobacco Never Used     Social History     Substance and Sexual Activity   Drug Use Never       Family History:    History reviewed  No pertinent family history  Physical Exam:     Vitals:   Blood Pressure: 126/79 (11/29/20 2330)  Pulse: 80 (11/29/20 2330)  Temperature: 98 9 °F (37 2 °C) (11/29/20 2043)  Temp Source: Tympanic (11/29/20 2043)  Respirations: 20 (11/29/20 2217)  Weight - Scale: 101 kg (222 lb) (11/29/20 2043)  SpO2: 96 % (11/29/20 2330)    Physical Exam  Vitals signs and nursing note reviewed  Constitutional:       Appearance: Normal appearance  HENT:      Head: Normocephalic and atraumatic  Nose: Nose normal    Eyes:      Extraocular Movements: Extraocular movements intact  Cardiovascular:      Rate and Rhythm: Normal rate and regular rhythm  Pulses: Normal pulses  Heart sounds: Normal heart sounds  No murmur  Pulmonary:      Effort: Pulmonary effort is normal  No respiratory distress  Breath sounds: Normal breath sounds  Abdominal:      General: Abdomen is flat  Bowel sounds are normal  There is no distension  Palpations: Abdomen is soft  Tenderness: There is abdominal tenderness (right lower abdominal pain, right groin pain)  Musculoskeletal: Normal range of motion  General: No swelling  Skin:     General: Skin is warm and dry  Neurological:      General: No focal deficit present  Mental Status: He is alert and oriented to person, place, and time     Psychiatric:         Mood and Affect: Mood normal  Behavior: Behavior normal            Additional Data:     Lab Results: I have personally reviewed pertinent reports  Results from last 7 days   Lab Units 11/29/20 2113   WBC Thousand/uL 11 53*   HEMOGLOBIN g/dL 13 1   HEMATOCRIT % 40 9   PLATELETS Thousands/uL 282   NEUTROS PCT % 57     Results from last 7 days   Lab Units 11/29/20 2113   SODIUM mmol/L 142   POTASSIUM mmol/L 3 2*   CHLORIDE mmol/L 105   CO2 mmol/L 27   BUN mg/dL 12   CREATININE mg/dL 1 30   CALCIUM mg/dL 8 3   TOTAL BILIRUBIN mg/dL 0 20   ALK PHOS U/L 77   ALT U/L 31   AST U/L 23     Results from last 7 days   Lab Units 11/29/20 2113   INR  1 03                   Imaging: I have personally reviewed pertinent reports  CT abdomen pelvis with contrast   Final Result by Suzanne Santos DO (11/29 2301)   Normal caliber appendix  Right-sided nephrolithiasis  3 mm obstructing stone in the distal right ureter with mild right hydroureteronephrosis proximal to this  Bladder is partially distended  Mild circumferential bladder wall thickening noted, possibly exaggerated by underdistention  Superimposed cystitis could be considered in the appropriate clinical setting  Correlation with the patient's symptoms,    laboratory values, and urinalysis recommended  Colonic diverticulosis without evidence of acute diverticulitis  Fatty infiltration of the liver is suspected  In the setting of abdominal pain and/or elevated liver function tests, consider steatohepatitis  Other findings as above  Workstation performed: [id number]             CT abdomen pelvis with contrast   Final Result   Normal caliber appendix  Right-sided nephrolithiasis  3 mm obstructing stone in the distal right ureter with mild right hydroureteronephrosis proximal to this  Bladder is partially distended  Mild circumferential bladder wall thickening noted, possibly exaggerated by underdistention    Superimposed cystitis could be considered in the appropriate clinical setting  Correlation with the patient's symptoms,    laboratory values, and urinalysis recommended  Colonic diverticulosis without evidence of acute diverticulitis  Fatty infiltration of the liver is suspected  In the setting of abdominal pain and/or elevated liver function tests, consider steatohepatitis  Other findings as above  Workstation performed: [id number]             EKG, Pathology, and Other Studies Reviewed on Admission:   · EKG: not applicable     Allscripts / Epic Records Reviewed: Yes     ** Please Note: This note has been constructed using a voice recognition system   **

## 2020-11-30 NOTE — QUICK NOTE
Patient had generalized itching with no rash after receiving rocephin - will give benadryl  Discontinue rocephin

## 2020-11-30 NOTE — ED PROVIDER NOTES
History  Chief Complaint   Patient presents with    Abdominal Pain     c/o low R groin/abd pain since thanksgiving  having hematuria today     70-year-old male states he has had pain in his right groin and abdomen since thanks given and today started having hematuria  No fevers no chills did have 1 episode of nausea and vomiting  States the pain is intermittent but is getting more and more constant as the day progresses  No alleviating factors aggravated by movement      History provided by:  Patient   used: No        Prior to Admission Medications   Prescriptions Last Dose Informant Patient Reported? Taking? albuterol (PROVENTIL HFA,VENTOLIN HFA) 90 mcg/act inhaler   Yes No   Sig: Inhale 2 puffs every 4 (four) hours as needed   naproxen (NAPROSYN) 500 mg tablet   Yes No   Sig: Take 1 tablet by mouth 2 (two) times a day      Facility-Administered Medications: None       Past Medical History:   Diagnosis Date    Asthma        Past Surgical History:   Procedure Laterality Date    JOINT REPLACEMENT Right     knee    KNEE SURGERY      SHOULDER SURGERY Right        History reviewed  No pertinent family history  I have reviewed and agree with the history as documented  E-Cigarette/Vaping    E-Cigarette Use Never User      E-Cigarette/Vaping Substances     Social History     Tobacco Use    Smoking status: Current Every Day Smoker     Packs/day: 0 50     Types: Cigarettes    Smokeless tobacco: Never Used   Substance Use Topics    Alcohol use: Not Currently    Drug use: Never       Review of Systems   Constitutional: Negative for activity change, chills, diaphoresis and fever  HENT: Negative for congestion, ear pain, nosebleeds, sore throat, trouble swallowing and voice change  Eyes: Negative for pain, discharge and redness  Respiratory: Negative for apnea, cough, choking, shortness of breath, wheezing and stridor  Cardiovascular: Negative for chest pain and palpitations  Gastrointestinal: Positive for abdominal pain, nausea and vomiting  Negative for abdominal distention, constipation and diarrhea  Endocrine: Negative for polydipsia  Genitourinary: Positive for testicular pain  Negative for difficulty urinating, dysuria, flank pain, frequency, hematuria and urgency  Musculoskeletal: Negative for back pain, gait problem, joint swelling, myalgias, neck pain and neck stiffness  Skin: Negative for pallor and rash  Neurological: Negative for dizziness, tremors, syncope, speech difficulty, weakness, numbness and headaches  Hematological: Negative for adenopathy  Psychiatric/Behavioral: Negative for confusion, hallucinations, self-injury and suicidal ideas  The patient is not nervous/anxious  Physical Exam  Physical Exam  Vitals signs and nursing note reviewed  Constitutional:       General: He is not in acute distress  Appearance: He is well-developed  He is not diaphoretic  HENT:      Head: Normocephalic and atraumatic  Right Ear: External ear normal       Left Ear: External ear normal       Nose: Nose normal    Eyes:      Conjunctiva/sclera: Conjunctivae normal       Pupils: Pupils are equal, round, and reactive to light  Neck:      Musculoskeletal: Normal range of motion and neck supple  Cardiovascular:      Rate and Rhythm: Normal rate and regular rhythm  Heart sounds: Normal heart sounds  Pulmonary:      Effort: Pulmonary effort is normal       Breath sounds: Normal breath sounds  Abdominal:      General: Bowel sounds are normal       Palpations: Abdomen is soft  Tenderness: There is abdominal tenderness in the right lower quadrant and suprapubic area  Genitourinary:     Scrotum/Testes: Cremasteric reflex is present  Right: Tenderness present  Musculoskeletal: Normal range of motion  Skin:     General: Skin is warm and dry  Neurological:      Mental Status: He is alert and oriented to person, place, and time  Deep Tendon Reflexes: Reflexes are normal and symmetric           Vital Signs  ED Triage Vitals   Temperature Pulse Respirations Blood Pressure SpO2   11/29/20 2043 11/29/20 2045 11/29/20 2043 11/29/20 2045 11/29/20 2045   98 9 °F (37 2 °C) 103 20 166/86 99 %      Temp Source Heart Rate Source Patient Position - Orthostatic VS BP Location FiO2 (%)   11/29/20 2043 11/29/20 2045 11/29/20 2043 11/29/20 2043 --   Tympanic Monitor Sitting Right arm       Pain Score       11/29/20 2043       8           Vitals:    11/29/20 2043 11/29/20 2045 11/29/20 2217 11/29/20 2330   BP:  166/86 127/69 126/79   Pulse:  103 90 80   Patient Position - Orthostatic VS: Sitting Sitting Lying          Visual Acuity      ED Medications  Medications   cefTRIAXone (ROCEPHIN) IVPB (premix in dextrose) 1,000 mg 50 mL (1,000 mg Intravenous New Bag 11/29/20 2330)   ketorolac (TORADOL) injection 15 mg (15 mg Intravenous Given 11/29/20 2118)   sodium chloride 0 9 % bolus 1,000 mL (0 mL Intravenous Stopped 11/29/20 2202)   HYDROmorphone (DILAUDID) injection 1 mg (1 mg Intravenous Given 11/29/20 2146)   iohexol (OMNIPAQUE) 350 MG/ML injection (SINGLE-DOSE) 100 mL (100 mL Intravenous Given 11/29/20 2201)   HYDROmorphone (DILAUDID) injection 0 5 mg (0 5 mg Intravenous Given 11/29/20 2216)   HYDROmorphone (DILAUDID) injection 0 5 mg (0 5 mg Intravenous Given 11/29/20 2335)       Diagnostic Studies  Results Reviewed     Procedure Component Value Units Date/Time    Urine Microscopic [[id number]]  (Abnormal) Collected: 11/29/20 2148    Lab Status: Final result Specimen: Urine, Clean Catch Updated: 11/29/20 2202     RBC, UA Innumerable /hpf      WBC, UA       Field obscured, unable to enumerate     /hpf     Epithelial Cells       Field obscured, unable to enumerate     /hpf     Bacteria, UA       Field obscured, unable to enumerate     /hpf    UA (URINE) with reflex to Scope [[id number]]  (Abnormal) Collected: 11/29/20 4851    Lab Status: Final result Specimen: Urine, Clean Catch Updated: 11/29/20 2158     Color, UA Red     Clarity, UA Other     Specific Gravity, UA 1 025     pH, UA 6 5     Leukocytes, UA Negative     Nitrite, UA Positive     Protein,  (2+) mg/dl      Glucose, UA Negative mg/dl      Ketones, UA Trace mg/dl      Urobilinogen, UA 1 0 E U /dl      Bilirubin, UA Interference- unable to analyze     Blood, UA Large    Protime-INR [[id number]]  (Normal) Collected: 11/29/20 2113    Lab Status: Final result Specimen: Blood from Arm, Left Updated: 11/29/20 2157     Protime 13 4 seconds      INR 1 03    APTT [[id number]]  (Normal) Collected: 11/29/20 2113    Lab Status: Final result Specimen: Blood from Arm, Left Updated: 11/29/20 2157     PTT 31 seconds     Urine culture [[id number]] Collected: 11/29/20 2148    Lab Status:  In process Specimen: Urine, Clean Catch Updated: 11/29/20 2150    Comprehensive metabolic panel [[id number]]  (Abnormal) Collected: 11/29/20 2113    Lab Status: Final result Specimen: Blood from Arm, Left Updated: 11/29/20 2134     Sodium 142 mmol/L      Potassium 3 2 mmol/L      Chloride 105 mmol/L      CO2 27 mmol/L      ANION GAP 10 mmol/L      BUN 12 mg/dL      Creatinine 1 30 mg/dL      Glucose 115 mg/dL      Calcium 8 3 mg/dL      AST 23 U/L      ALT 31 U/L      Alkaline Phosphatase 77 U/L      Total Protein 7 1 g/dL      Albumin 3 7 g/dL      Total Bilirubin 0 20 mg/dL      eGFR 81 ml/min/1 73sq m     Narrative:      Dale guidelines for Chronic Kidney Disease (CKD):     Stage 1 with normal or high GFR (GFR > 90 mL/min/1 73 square meters)    Stage 2 Mild CKD (GFR = 60-89 mL/min/1 73 square meters)    Stage 3A Moderate CKD (GFR = 45-59 mL/min/1 73 square meters)    Stage 3B Moderate CKD (GFR = 30-44 mL/min/1 73 square meters)    Stage 4 Severe CKD (GFR = 15-29 mL/min/1 73 square meters)    Stage 5 End Stage CKD (GFR <15 mL/min/1 73 square meters)  Note: GFR calculation is accurate only with a steady state creatinine    CBC and differential [[id number]]  (Abnormal) Collected: 11/29/20 2113    Lab Status: Final result Specimen: Blood from Arm, Left Updated: 11/29/20 2118     WBC 11 53 Thousand/uL      RBC 4 39 Million/uL      Hemoglobin 13 1 g/dL      Hematocrit 40 9 %      MCV 93 fL      MCH 29 8 pg      MCHC 32 0 g/dL      RDW 11 7 %      MPV 10 5 fL      Platelets 646 Thousands/uL      nRBC 0 /100 WBCs      Neutrophils Relative 57 %      Immat GRANS % 0 %      Lymphocytes Relative 33 %      Monocytes Relative 8 %      Eosinophils Relative 2 %      Basophils Relative 0 %      Neutrophils Absolute 6 53 Thousands/µL      Immature Grans Absolute 0 03 Thousand/uL      Lymphocytes Absolute 3 75 Thousands/µL      Monocytes Absolute 0 96 Thousand/µL      Eosinophils Absolute 0 23 Thousand/µL      Basophils Absolute 0 03 Thousands/µL                  CT abdomen pelvis with contrast   Final Result by Magda Martin DO (11/29 2301)   Normal caliber appendix  Right-sided nephrolithiasis  3 mm obstructing stone in the distal right ureter with mild right hydroureteronephrosis proximal to this  Bladder is partially distended  Mild circumferential bladder wall thickening noted, possibly exaggerated by underdistention  Superimposed cystitis could be considered in the appropriate clinical setting  Correlation with the patient's symptoms,    laboratory values, and urinalysis recommended  Colonic diverticulosis without evidence of acute diverticulitis  Fatty infiltration of the liver is suspected  In the setting of abdominal pain and/or elevated liver function tests, consider steatohepatitis  Other findings as above              Workstation performed: [id number]                    Procedures  Procedures         ED Course                                       MDM    Disposition  Final diagnoses:   UTI (urinary tract infection)   Kidney stone     Time reflects when diagnosis was documented in both MDM as applicable and the Disposition within this note     Time User Action Codes Description Comment    11/29/2020 11:23 PM Maritza BABB Add [N39 0] UTI (urinary tract infection)     11/29/2020 11:23 PM Philly Krystal Add [N20 0] Kidney stone       ED Disposition     ED Disposition Condition Date/Time Comment    Admit Stable Amy Nov 29, 2020 11:23 PM Case was discussed with Dr Avril Reed and Rayo Cooper and the patient's admission status was agreed to be Admission Status: observation status to the service of Dr Kelley Childress   Follow-up Information    None         Patient's Medications   Discharge Prescriptions    No medications on file     No discharge procedures on file      PDMP Review     None          ED Provider  Electronically Signed by           Gulshan Almodovar, DO  11/29/20 [address], DO  11/29/20 3703

## 2020-11-30 NOTE — ASSESSMENT & PLAN NOTE
Patient reports abdominal/groin pain for three days with some hematuria/dysuria beginning yesterday  CT revealed mild circumferential bladder wall thickening, concern for superimposed cystitis  Urinalysis revealed nitrites, bacteria, blood      · Continue rocephin  · Urine cultures pending

## 2020-12-01 ENCOUNTER — APPOINTMENT (OUTPATIENT)
Dept: RADIOLOGY | Facility: HOSPITAL | Age: 35
DRG: 446 | End: 2020-12-01
Payer: COMMERCIAL

## 2020-12-01 ENCOUNTER — ANESTHESIA EVENT (INPATIENT)
Dept: PERIOP | Facility: HOSPITAL | Age: 35
DRG: 446 | End: 2020-12-01
Payer: COMMERCIAL

## 2020-12-01 LAB
ANION GAP SERPL CALCULATED.3IONS-SCNC: 6 MMOL/L (ref 4–13)
BACTERIA UR CULT: NORMAL
BASOPHILS # BLD AUTO: 0.01 THOUSANDS/ΜL (ref 0–0.1)
BASOPHILS NFR BLD AUTO: 0 % (ref 0–1)
BUN SERPL-MCNC: 9 MG/DL (ref 5–25)
CALCIUM SERPL-MCNC: 8.5 MG/DL (ref 8.3–10.1)
CHLORIDE SERPL-SCNC: 103 MMOL/L (ref 100–108)
CO2 SERPL-SCNC: 28 MMOL/L (ref 21–32)
CREAT SERPL-MCNC: 1.06 MG/DL (ref 0.6–1.3)
EOSINOPHIL # BLD AUTO: 0.16 THOUSAND/ΜL (ref 0–0.61)
EOSINOPHIL NFR BLD AUTO: 2 % (ref 0–6)
ERYTHROCYTE [DISTWIDTH] IN BLOOD BY AUTOMATED COUNT: 11.5 % (ref 11.6–15.1)
GFR SERPL CREATININE-BSD FRML MDRD: 104 ML/MIN/1.73SQ M
GLUCOSE SERPL-MCNC: 102 MG/DL (ref 65–140)
HCT VFR BLD AUTO: 42 % (ref 36.5–49.3)
HGB BLD-MCNC: 13.5 G/DL (ref 12–17)
IMM GRANULOCYTES # BLD AUTO: 0.02 THOUSAND/UL (ref 0–0.2)
IMM GRANULOCYTES NFR BLD AUTO: 0 % (ref 0–2)
LYMPHOCYTES # BLD AUTO: 2.09 THOUSANDS/ΜL (ref 0.6–4.47)
LYMPHOCYTES NFR BLD AUTO: 25 % (ref 14–44)
MAGNESIUM SERPL-MCNC: 1.8 MG/DL (ref 1.6–2.6)
MCH RBC QN AUTO: 29.5 PG (ref 26.8–34.3)
MCHC RBC AUTO-ENTMCNC: 32.1 G/DL (ref 31.4–37.4)
MCV RBC AUTO: 92 FL (ref 82–98)
MONOCYTES # BLD AUTO: 0.42 THOUSAND/ΜL (ref 0.17–1.22)
MONOCYTES NFR BLD AUTO: 5 % (ref 4–12)
NEUTROPHILS # BLD AUTO: 5.62 THOUSANDS/ΜL (ref 1.85–7.62)
NEUTS SEG NFR BLD AUTO: 68 % (ref 43–75)
NRBC BLD AUTO-RTO: 0 /100 WBCS
PLATELET # BLD AUTO: 269 THOUSANDS/UL (ref 149–390)
PMV BLD AUTO: 10.6 FL (ref 8.9–12.7)
POTASSIUM SERPL-SCNC: 4.2 MMOL/L (ref 3.5–5.3)
RBC # BLD AUTO: 4.57 MILLION/UL (ref 3.88–5.62)
SODIUM SERPL-SCNC: 137 MMOL/L (ref 136–145)
WBC # BLD AUTO: 8.32 THOUSAND/UL (ref 4.31–10.16)

## 2020-12-01 PROCEDURE — 85025 COMPLETE CBC W/AUTO DIFF WBC: CPT | Performed by: INTERNAL MEDICINE

## 2020-12-01 PROCEDURE — 83735 ASSAY OF MAGNESIUM: CPT | Performed by: INTERNAL MEDICINE

## 2020-12-01 PROCEDURE — 99232 SBSQ HOSP IP/OBS MODERATE 35: CPT | Performed by: FAMILY MEDICINE

## 2020-12-01 PROCEDURE — 80048 BASIC METABOLIC PNL TOTAL CA: CPT | Performed by: INTERNAL MEDICINE

## 2020-12-01 PROCEDURE — 94760 N-INVAS EAR/PLS OXIMETRY 1: CPT

## 2020-12-01 PROCEDURE — 74018 RADEX ABDOMEN 1 VIEW: CPT

## 2020-12-01 RX ORDER — OXYCODONE HYDROCHLORIDE 5 MG/1
5 TABLET ORAL EVERY 4 HOURS PRN
Status: DISCONTINUED | OUTPATIENT
Start: 2020-12-01 | End: 2020-12-02 | Stop reason: HOSPADM

## 2020-12-01 RX ADMIN — SODIUM CHLORIDE 200 ML/HR: 0.9 INJECTION, SOLUTION INTRAVENOUS at 15:51

## 2020-12-01 RX ADMIN — HYDROMORPHONE HYDROCHLORIDE 0.5 MG: 1 INJECTION, SOLUTION INTRAMUSCULAR; INTRAVENOUS; SUBCUTANEOUS at 15:50

## 2020-12-01 RX ADMIN — HYDROMORPHONE HYDROCHLORIDE 0.5 MG: 1 INJECTION, SOLUTION INTRAMUSCULAR; INTRAVENOUS; SUBCUTANEOUS at 12:50

## 2020-12-01 RX ADMIN — TAMSULOSIN HYDROCHLORIDE 0.4 MG: 0.4 CAPSULE ORAL at 15:51

## 2020-12-01 RX ADMIN — HYDROMORPHONE HYDROCHLORIDE 0.5 MG: 1 INJECTION, SOLUTION INTRAMUSCULAR; INTRAVENOUS; SUBCUTANEOUS at 23:48

## 2020-12-01 RX ADMIN — KETOROLAC TROMETHAMINE 15 MG: 30 INJECTION, SOLUTION INTRAMUSCULAR at 21:14

## 2020-12-01 RX ADMIN — SODIUM CHLORIDE 200 ML/HR: 0.9 INJECTION, SOLUTION INTRAVENOUS at 06:46

## 2020-12-01 RX ADMIN — SODIUM CHLORIDE 200 ML/HR: 0.9 INJECTION, SOLUTION INTRAVENOUS at 21:10

## 2020-12-01 RX ADMIN — HYDROMORPHONE HYDROCHLORIDE 0.5 MG: 1 INJECTION, SOLUTION INTRAMUSCULAR; INTRAVENOUS; SUBCUTANEOUS at 08:12

## 2020-12-01 RX ADMIN — Medication 1 PATCH: at 08:12

## 2020-12-01 RX ADMIN — KETOROLAC TROMETHAMINE 15 MG: 30 INJECTION, SOLUTION INTRAMUSCULAR at 10:04

## 2020-12-01 RX ADMIN — HYDROMORPHONE HYDROCHLORIDE 0.5 MG: 1 INJECTION, SOLUTION INTRAMUSCULAR; INTRAVENOUS; SUBCUTANEOUS at 18:35

## 2020-12-01 RX ADMIN — HYDROMORPHONE HYDROCHLORIDE 0.5 MG: 1 INJECTION, SOLUTION INTRAMUSCULAR; INTRAVENOUS; SUBCUTANEOUS at 05:22

## 2020-12-01 NOTE — PLAN OF CARE
Problem: PAIN - ADULT  Goal: Verbalizes/displays adequate comfort level or baseline comfort level  Description: Interventions:  - Encourage patient to monitor pain and request assistance  - Assess pain using appropriate pain scale  - Administer analgesics based on type and severity of pain and evaluate response  - Implement non-pharmacological measures as appropriate and evaluate response  - Consider cultural and social influences on pain and pain management  - Notify physician/advanced practitioner if interventions unsuccessful or patient reports new pain  Outcome: Progressing     Problem: INFECTION - ADULT  Goal: Absence or prevention of progression during hospitalization  Description: INTERVENTIONS:  - Assess and monitor for signs and symptoms of infection  - Monitor lab/diagnostic results  - Monitor all insertion sites, i e  indwelling lines, tubes, and drains  - Monitor endotracheal if appropriate and nasal secretions for changes in amount and color  - Umpire appropriate cooling/warming therapies per order  - Administer medications as ordered  - Instruct and encourage patient and family to use good hand hygiene technique  - Identify and instruct in appropriate isolation precautions for identified infection/condition  Outcome: Progressing  Goal: Absence of fever/infection during neutropenic period  Description: INTERVENTIONS:  - Monitor WBC    Outcome: Progressing     Problem: SAFETY ADULT  Goal: Patient will remain free of falls  Description: INTERVENTIONS:  - Assess patient frequently for physical needs  -  Identify cognitive and physical deficits and behaviors that affect risk of falls    -  Umpire fall precautions as indicated by assessment   - Educate patient/family on patient safety including physical limitations  - Instruct patient to call for assistance with activity based on assessment  - Modify environment to reduce risk of injury  - Consider OT/PT consult to assist with strengthening/mobility  Outcome: Progressing  Goal: Maintain or return to baseline ADL function  Description: INTERVENTIONS:  -  Assess patient's ability to carry out ADLs; assess patient's baseline for ADL function and identify physical deficits which impact ability to perform ADLs (bathing, care of mouth/teeth, toileting, grooming, dressing, etc )  - Assess/evaluate cause of self-care deficits   - Assess range of motion  - Assess patient's mobility; develop plan if impaired  - Assess patient's need for assistive devices and provide as appropriate  - Encourage maximum independence but intervene and supervise when necessary  - Involve family in performance of ADLs  - Assess for home care needs following discharge   - Consider OT consult to assist with ADL evaluation and planning for discharge  - Provide patient education as appropriate  Outcome: Progressing  Goal: Maintain or return mobility status to optimal level  Description: INTERVENTIONS:  - Assess patient's baseline mobility status (ambulation, transfers, stairs, etc )    - Identify cognitive and physical deficits and behaviors that affect mobility  - Identify mobility aids required to assist with transfers and/or ambulation (gait belt, sit-to-stand, lift, walker, cane, etc )  - [place] fall precautions as indicated by assessment  - Record patient progress and toleration of activity level on Mobility SBAR; progress patient to next Phase/Stage  - Instruct patient to call for assistance with activity based on assessment  - Consider rehabilitation consult to assist with strengthening/weightbearing, etc   Outcome: Progressing     Problem: DISCHARGE PLANNING  Goal: Discharge to home or other facility with appropriate resources  Description: INTERVENTIONS:  - Identify barriers to discharge w/patient and caregiver  - Arrange for needed discharge resources and transportation as appropriate  - Identify discharge learning needs (meds, wound care, etc )  - Arrange for interpretive services to assist at discharge as needed  - Refer to Case Management Department for coordinating discharge planning if the patient needs post-hospital services based on physician/advanced practitioner order or complex needs related to functional status, cognitive ability, or social support system  Outcome: Progressing     Problem: Knowledge Deficit  Goal: Patient/family/caregiver demonstrates understanding of disease process, treatment plan, medications, and discharge instructions  Description: Complete learning assessment and assess knowledge base    Interventions:  - Provide teaching at level of understanding  - Provide teaching via preferred learning methods  Outcome: Progressing     Problem: RESPIRATORY - ADULT  Goal: Achieves optimal ventilation and oxygenation  Description: INTERVENTIONS:  - Assess for changes in respiratory status  - Assess for changes in mentation and behavior  - Position to facilitate oxygenation and minimize respiratory effort  - Oxygen administered by appropriate delivery if ordered  - Initiate smoking cessation education as indicated  - Encourage broncho-pulmonary hygiene including cough, deep breathe, Incentive Spirometry  - Assess the need for suctioning and aspirate as needed  - Assess and instruct to report SOB or any respiratory difficulty  - Respiratory Therapy support as indicated  Outcome: Progressing

## 2020-12-01 NOTE — PHYSICIAN ADVISOR
Current patient class: Inpatient  The patient is currently on Hospital Day: 3 at [address] [place] [address]      The patient was admitted to the hospital at [id number] on 12/1/20 for the following diagnosis:  Kidney stone [N20 0]  UTI (urinary tract infection) [N39 0]  Abdominal pain [R10 9]       There is documentation in the medical record of an expected length of stay of at least 2 midnights  The patient is therefore expected to satisfy the 2 midnight benchmark and given the 2 midnight presumption is appropriate for INPATIENT ADMISSION  Given this expectation of a satisfying stay, CMS instructs us that the patient is most often appropriate for inpatient admission under part A provided medical necessity is documented in the chart  After review of the relevant documentation, labs, vital signs and test results, the patient is appropriate for INPATIENT ADMISSION  Admission to the hospital as an inpatient is a complex decision making process which requires the practitioner to consider the patients presenting complaint, history and physical examination and all relevant testing  With this in mind, in this case, the patient was deemed appropriate for INPATIENT ADMISSION  After review of the documentation and testing available at the time of the admission I concur with this clinical determination of medical necessity  Rationale is as follows: The patient is a 38 yrs old Male who presented to the ED at 11/29/2020  8:48 PM with a chief complaint of Abdominal Pain (c/o low R groin/abd pain since thanksgiving  having hematuria today)  Patient still presents with pain today  He does have a distal right stone which they are hoping will pass however given that the patient is still receiving IV pain medications and has no improvement with the potential of going to the OR tomorrow for for a cystoscopy with ureteroscopy and stent patient is inpatient status appropriate    Patient has been receiving multiple doses of IV Dilaudid  He received 6 doses yesterday and 3 already so for today      The patients vitals on arrival were   ED Triage Vitals   Temperature Pulse Respirations Blood Pressure SpO2   11/29/20 2043 11/29/20 2045 11/29/20 2043 11/29/20 2045 11/29/20 2045   98 9 °F (37 2 °C) 103 20 166/86 99 %      Temp Source Heart Rate Source Patient Position - Orthostatic VS BP Location FiO2 (%)   11/29/20 2043 11/29/20 2045 11/29/20 2043 11/29/20 2043 --   Tympanic Monitor Sitting Right arm       Pain Score       11/29/20 2043       8           Past Medical History:   Diagnosis Date    Asthma      Past Surgical History:   Procedure Laterality Date    JOINT REPLACEMENT Right     knee    KNEE SURGERY      SHOULDER SURGERY Right            Consults have been placed to:   IP CONSULT TO UROLOGY    Vitals:    11/30/20 1921 11/30/20 2116 12/01/20 0056 12/01/20 0818   BP: 119/81  122/66 144/96   BP Location:       Pulse: 64 68 69 71   Resp: 18 18 19 20   Temp: 98 4 °F (36 9 °C)  98 2 °F (36 8 °C) 98 5 °F (36 9 °C)   TempSrc:       SpO2: 97% 98% 97% 100%   Weight:           Most recent labs:    Recent Labs     11/29/20 2113 11/30/20  0502 12/01/20  1058   WBC 11 53* 10 38* 8 32   HGB 13 1 11 9* 13 5   HCT 40 9 38 6 42 0    256 269   K 3 2* 3 4* 4 2   CALCIUM 8 3 7 9* 8 5   BUN 12 11 9   CREATININE 1 30 1 02 1 06   INR 1 03  --   --    AST 23 16  --    ALT 31 25  --    ALKPHOS 77 66  --        Scheduled Meds:  Current Facility-Administered Medications   Medication Dose Route Frequency Provider Last Rate    acetaminophen  650 mg Oral Q6H PRN SANA Kwong      albuterol  2 5 mg Nebulization Q6H PRN SANA Kwong      diphenhydrAMINE  25 mg Oral Q6H PRN SANA Kwong      HYDROmorphone  0 5 mg Intravenous Q6H PRN Tawny Briceno MD      HYDROmorphone  0 5 mg Intravenous Q3H PRN Tawny Briceno MD      ketorolac  15 mg Intravenous Q6H PRN Tawny Briceno MD      nicotine  1 patch Transdermal Daily Evangelista Ala, CRNP      ondansetron  4 mg Intravenous Q6H PRN Evangelista Ala, CRNP      sodium chloride  200 mL/hr Intravenous Continuous Niko Guevara  mL/hr (12/01/20 1361)    tamsulosin  0 4 mg Oral Daily With Dinner Evangelista Ala, CRNP       Continuous Infusions:sodium chloride, 200 mL/hr, Last Rate: 200 mL/hr (12/01/20 0672)      PRN Meds:   acetaminophen    albuterol    diphenhydrAMINE    HYDROmorphone    HYDROmorphone    ketorolac    ondansetron    Surgical procedures (if appropriate):  Procedure(s):  CYSTOSCOPY URETEROSCOPY WITH LITHOTRIPSY HOLMIUM LASER, RETROGRADE PYELOGRAM AND INSERTION STENT URETERAL

## 2020-12-01 NOTE — ASSESSMENT & PLAN NOTE
Patient presented to the ED with complaints of right sided abdominal/groin pain  He had some nausea and vomiting last night  He reports dysuria, hematuria  He has no fevers or chills  CT revealed right sided nephrolithiasis, 3 mm obstructing stone in the distal right ureter with mild right hydroureteronephrosis proximal to this       Continue IV fluids with pain management   Strain all urine   Continue Flomax   Discussed with Urology   If patient does not passed own with conservative management possible cystoscopy tomorrow

## 2020-12-01 NOTE — PROGRESS NOTES
Progress Note - Karissa Cano 1985, 38 y o  male MRN: [id number]    Unit/Bed#: [address] [place] [address] Encounter: [id number]    Primary Care Provider: No primary care provider on file  Date and time admitted to hospital: 11/29/2020  8:48 PM        * Ureteral stone  Assessment & Plan  Patient presented to the ED with complaints of right sided abdominal/groin pain  He had some nausea and vomiting last night  He reports dysuria, hematuria  He has no fevers or chills  CT revealed right sided nephrolithiasis, 3 mm obstructing stone in the distal right ureter with mild right hydroureteronephrosis proximal to this   Continue IV fluids with pain management   Strain all urine   Continue Flomax   Discussed with Urology   If patient does not passed own with conservative management possible cystoscopy tomorrow    Cystitis  Assessment & Plan  Patient reports abdominal/groin pain for three days with some hematuria/dysuria beginning yesterday  CT revealed mild circumferential bladder wall thickening, concern for superimposed cystitis  Urinalysis revealed nitrites, bacteria, blood  · Continue rocephin  · Urine cultures pending    Tobacco dependence  Assessment & Plan  Continue nicoderm patch    Hypokalemia  Assessment & Plan  Potassium 3 2, did have some intractable nausea and vomiting last night  Improved with repletion  Ordered for another KCl 40 milliequivalents p o  VTE Pharmacologic Prophylaxis:   Pharmacologic: Low risk  Mechanical VTE Prophylaxis in Place: Yes    Patient Centered Rounds: I have performed bedside rounds with nursing staff today  Discussions with Specialists or Other Care Team Provider: Dr Corinne Roup and Discussions with Family / Patient: yes    Time Spent for Care: 45 minutes  More than 50% of total time spent on counseling and coordination of care as described above      Current Length of Stay: 0 day(s)    Current Patient Status: Observation   Certification Statement: The patient will continue to require additional inpatient hospital stay due to Renal colic, UTI    Discharge Plan:  Home    Code Status: Level 1 - Full Code      Subjective:   Patient continues to complain of right-sided flank pain radiating into the right groin  Denies any nausea vomiting    Objective:     Vitals:   Temp (24hrs), Av 3 °F (36 8 °C), Min:98 °F (36 7 °C), Max:98 9 °F (37 2 °C)    Temp:  [98 °F (36 7 °C)-98 9 °F (37 2 °C)] 98 4 °F (36 9 °C)  HR:  [] 64  Resp:  [18-20] 18  BP: (106-166)/(62-86) 119/81  SpO2:  [96 %-100 %] 97 %  Body mass index is 34 77 kg/m²  Input and Output Summary (last 24 hours): Intake/Output Summary (Last 24 hours) at 2020  Last data filed at 2020 1613  Gross per 24 hour   Intake 1050 ml   Output 600 ml   Net 450 ml       Physical Exam:     Physical Exam  Constitutional:       General: He is not in acute distress  HENT:      Head: Normocephalic and atraumatic  Eyes:      Conjunctiva/sclera: Conjunctivae normal       Pupils: Pupils are equal, round, and reactive to light  Neck:      Musculoskeletal: Normal range of motion and neck supple  Cardiovascular:      Rate and Rhythm: Normal rate and regular rhythm  Heart sounds: Normal heart sounds  Pulmonary:      Effort: Pulmonary effort is normal  No respiratory distress  Breath sounds: No wheezing, rhonchi or rales  Chest:      Chest wall: No tenderness  Abdominal:      General: Bowel sounds are normal  There is no distension  Palpations: Abdomen is soft  Tenderness: There is no abdominal tenderness  There is no guarding or rebound  Skin:     General: Skin is warm and dry  Findings: No rash  Neurological:      Mental Status: He is alert  Cranial Nerves: No cranial nerve deficit           Additional Data:     Labs:    Results from last 7 days   Lab Units 20  0502 20  2113   WBC Thousand/uL 10 38* 11 53*   HEMOGLOBIN g/dL 11 9* 13 1 HEMATOCRIT % 38 6 40 9   PLATELETS Thousands/uL 256 282   NEUTROS PCT %  --  57   LYMPHS PCT %  --  33   MONOS PCT %  --  8   EOS PCT %  --  2     Results from last 7 days   Lab Units 11/30/20  0502   POTASSIUM mmol/L 3 4*   CHLORIDE mmol/L 108   CO2 mmol/L 27   BUN mg/dL 11   CREATININE mg/dL 1 02   CALCIUM mg/dL 7 9*   ALK PHOS U/L 66   ALT U/L 25   AST U/L 16     Results from last 7 days   Lab Units 11/29/20  2113   INR  1 03       * I Have Reviewed All Lab Data Listed Above  * Additional Pertinent Lab Tests Reviewed: [place] 66 Admission Reviewed    Imaging:    Imaging Reports Reviewed Today Include:  CT abdomen pelvis      Recent Cultures (last 7 days):           Last 24 Hours Medication List:   Current Facility-Administered Medications   Medication Dose Route Frequency Provider Last Rate    acetaminophen  650 mg Oral Q6H PRN SANA Archer      albuterol  2 5 mg Nebulization Q6H PRN SANA Archer      diphenhydrAMINE  25 mg Oral Q6H PRN SANA Archer      HYDROmorphone  0 5 mg Intravenous Q6H PRN Beth Diop MD      HYDROmorphone  0 5 mg Intravenous Q3H PRN Beth Diop MD      ketorolac  15 mg Intravenous Q6H PRN Beth Diop MD      nicotine  1 patch Transdermal Daily SANA Archer      ondansetron  4 mg Intravenous Q6H PRN SANA Archer      potassium chloride  40 mEq Oral Once Beth Diop MD      sodium chloride  200 mL/hr Intravenous Continuous Beth Diop  mL/hr (11/30/20 1644)    tamsulosin  0 4 mg Oral Daily With [address], CRNP          Today, Patient Was Seen By: Beth Diop MD    ** Please Note: Dictation voice to text software may have been used in the creation of this document   **

## 2020-12-01 NOTE — PROGRESS NOTES
[facility] Progress Note  Patient: True Wen 38 y o  male   MRN: [id number]  PCP: No primary care provider on file  Unit/Bed#: [address] [place] [address] Encounter: [id number]  Date Of Visit: 12/01/20    Problem List:    Principal Problem:    Ureteral stone  Active Problems:    Cystitis    Hypokalemia    Tobacco dependence      Assessment & Plan:    * Ureteral stone  Assessment & Plan  Patient presented to the ED with complaints of right sided abdominal/groin pain  He had some nausea and vomiting last night  He reports dysuria, hematuria  He has no fevers or chills  CT revealed right sided nephrolithiasis, 3 mm obstructing stone in the distal right ureter with mild right hydroureteronephrosis proximal to this   Continue IV fluids with pain management   Strain all urine   Continue Flomax   Discussed with Urology   If patient does not pass stone on his own with conservative management possible cystoscopy tomorrow   Continue current pain meds  Discussed with patient and also started on oxycodone p r n  Cystitis  Assessment & Plan  Patient reported abdominal/groin pain for three days with some hematuria/dysuria beginning yesterday  CT revealed mild circumferential bladder wall thickening, concern for superimposed cystitis  Urinalysis revealed nitrites, bacteria, blood  · Continue rocephin    Tobacco dependence  Assessment & Plan  Continue nicoderm patch  Hypokalemia  Assessment & Plan  Repleted  He did have some intractable nausea and vomiting previously  Repeat lab work in a m  VTE Pharmacologic Prophylaxis:   Pharmacologic: Pharmacologic VTE Prophylaxis contraindicated due to Low risk, patient ambulatory  Mechanical VTE Prophylaxis in Place: Yes    Patient Centered Rounds: I have performed bedside rounds with nursing staff today      Discussions with Specialists or Other Care Team Provider: yes    Education and Discussions with Family / Patient: yes    Time Spent for Care: 30 minutes  More than 50% of total time spent on counseling and coordination of care as described above  Current Length of Stay: 0 day(s)    Current Patient Status: Inpatient   Certification Statement: The patient will continue to require additional inpatient hospital stay due to Ureteral stone with continued pain requiring OR intervention tomorrow    Discharge Plan: home    Code Status: Level 1 - Full Code      Subjective:   Continues to report pain  Has not passed the stone yet  Objective:     Vitals:   Temp (24hrs), Av 3 °F (36 8 °C), Min:98 1 °F (36 7 °C), Max:98 5 °F (36 9 °C)    Temp:  [98 1 °F (36 7 °C)-98 5 °F (36 9 °C)] 98 1 °F (36 7 °C)  HR:  [68-73] 68  Resp:  [18-20] 18  BP: (122-148)/(66-96) 148/87  SpO2:  [95 %-100 %] 96 %  Body mass index is 34 77 kg/m²  Input and Output Summary (last 24 hours): Intake/Output Summary (Last 24 hours) at 2020  Last data filed at 2020  Gross per 24 hour   Intake 10 ml   Output --   Net 10 ml       Physical Exam:     Physical Exam  Constitutional:       General: He is not in acute distress  HENT:      Head: Normocephalic and atraumatic  Eyes:      General: No scleral icterus  Cardiovascular:      Rate and Rhythm: Normal rate and regular rhythm  Pulmonary:      Effort: Pulmonary effort is normal  No respiratory distress  Breath sounds: Normal breath sounds  No wheezing  Abdominal:      General: Bowel sounds are normal  There is no distension  Palpations: Abdomen is soft  Tenderness: There is no abdominal tenderness  Musculoskeletal:      Right lower leg: No edema  Left lower leg: No edema  Neurological:      General: No focal deficit present  Mental Status: He is alert and oriented to person, place, and time           Additional Data:     Labs:    Results from last 7 days   Lab Units 20  1058   WBC Thousand/uL 8 32   HEMOGLOBIN g/dL 13 5   HEMATOCRIT % 42 0   PLATELETS Thousands/uL 269   NEUTROS PCT % 68   LYMPHS PCT % 25   MONOS PCT % 5   EOS PCT % 2     Results from last 7 days   Lab Units 12/01/20  1058 11/30/20  0502   POTASSIUM mmol/L 4 2 3 4*   CHLORIDE mmol/L 103 108   CO2 mmol/L 28 27   BUN mg/dL 9 11   CREATININE mg/dL 1 06 1 02   CALCIUM mg/dL 8 5 7 9*   ALK PHOS U/L  --  66   ALT U/L  --  25   AST U/L  --  16     Results from last 7 days   Lab Units 11/29/20  2113   INR  1 03       * I Have Reviewed All Lab Data Listed Above  * Additional Pertinent Lab Tests Reviewed: [place] 66 Admission Reviewed      Imaging:  Imaging Reports Reviewed Today Include: CT abd/pelvis  Imaging Personally Reviewed by Myself Includes:  N/A    Recent Cultures (last 7 days):     Results from last 7 days   Lab Units 11/29/20  2148   URINE CULTURE  No Growth <1000 cfu/mL       Last 24 Hours Medication List:   Current Facility-Administered Medications   Medication Dose Route Frequency Provider Last Rate    acetaminophen  650 mg Oral Q6H PRN Meriam Pouch, CRNP      albuterol  2 5 mg Nebulization Q6H PRN Meriam Pouch, CRNP      diphenhydrAMINE  25 mg Oral Q6H PRN Meriam Pouch, CRNP      HYDROmorphone  0 5 mg Intravenous Q6H PRN Tiarra Levine MD      HYDROmorphone  0 5 mg Intravenous Q3H PRN Tiarra Levine MD      ketorolac  15 mg Intravenous Q6H PRN Tiarra Levine MD      nicotine  1 patch Transdermal Daily Meriam Pouch, CRNP      ondansetron  4 mg Intravenous Q6H PRN Meriam Pouch, CRNP      oxyCODONE  5 mg Oral Q4H PRN Mary Shannon DO      sodium chloride  200 mL/hr Intravenous Continuous Tiarra Levine  mL/hr (12/01/20 1551)    tamsulosin  0 4 mg Oral Daily With [address], CRNP            Today, Patient Was Seen By: Rashard Roger DO    ** Please Note: "This note has been constructed using a voice recognition system  Therefore there may be syntax, spelling, and/or grammatical errors   Please call if you have any questions  "**

## 2020-12-01 NOTE — ASSESSMENT & PLAN NOTE
Patient presented to the ED with complaints of right sided abdominal/groin pain  CT revealed right sided nephrolithiasis, 3 mm obstructing stone in the distal right ureter with mild right hydroureteronephrosis proximal to this      [address]  urology input   POD #0 s/p cystoscopy with ureteroscopy with lithotripsy holmium laser, retrograde pyelogram, stone basket retrieval, ureteral dilation and right ureteral stent insertion    Urinating well   Eating and drinking well   Will discharge home with outpatient urology follow-up in 1 -2 weeks    Oxycodone 5 mg po PRN for pain   Flomax 0 4 mg daily

## 2020-12-01 NOTE — PROGRESS NOTES
Progress Note - Urology      Patient: Shiv Carlson   : 1985 Sex: male   MRN: [id number]     CSN: [id number]  Unit/Bed#: [address] [place] [address]     SUBJECTIVE:   Pain 6/10  Refusing to go home  ty to pass      Objective   Vitals: /96   Pulse 71   Temp 98 5 °F (36 9 °C)   Resp 20   Wt 101 kg (222 lb)   SpO2 100%   BMI 34 77 kg/m²     I/O last 24 hours:   In: 743 3 [I V :743 3]  Out: 600 [Urine:600]      Physical Exam:   General Alert awake   Normocephalic atraumatic PERRLA  Lungs clear bilaterally  Cardiac normal S1 normal S2  Abdomen soft, flank pain  Extremities no edema      Lab Results: CBC:   Lab Results   Component Value Date    WBC 8 32 2020    HGB 13 5 2020    HCT 42 0 2020    MCV 92 2020     2020    MCH 29 5 2020    MCHC 32 1 2020    RDW 11 5 (L) 2020    MPV 10 6 2020    NRBC 0 2020     CMP:   Lab Results   Component Value Date     2020    CO2 28 2020    BUN 9 2020    CREATININE 1 06 2020    CALCIUM 8 5 2020    AST 16 2020    ALT 25 2020    ALKPHOS 66 2020    EGFR 104 2020     Urinalysis:   Lab Results   Component Value Date    COLORU Red 2020    CLARITYU Other 2020    SPECGRAV 1 025 2020    PHUR 6 5 2020    LEUKOCYTESUR Negative 2020    NITRITE Positive (A) 2020    GLUCOSEU Negative 2020    KETONESU Trace (A) 2020    BILIRUBINUR Interference- unable to analyze (A) 2020    BLOODU Large (A) 2020     Urine Culture:   Lab Results   Component Value Date    URINECX No Growth <1000 cfu/mL 2020     PSA: No results found for: PSA      Assessment/ Plan:  Right distal stone 3mm 99% chance passing   still having pain  Renal ultra/kub  If doesn't pass will put on OR schedule beatriz  Cysto/ureteroscopy/stent      Rushie Blackbird, MD

## 2020-12-01 NOTE — PLAN OF CARE
Problem: PAIN - ADULT  Goal: Verbalizes/displays adequate comfort level or baseline comfort level  Description: Interventions:  - Encourage patient to monitor pain and request assistance  - Assess pain using appropriate pain scale  - Administer analgesics based on type and severity of pain and evaluate response  - Implement non-pharmacological measures as appropriate and evaluate response  - Consider cultural and social influences on pain and pain management  - Notify physician/advanced practitioner if interventions unsuccessful or patient reports new pain  Outcome: Progressing     Problem: INFECTION - ADULT  Goal: Absence or prevention of progression during hospitalization  Description: INTERVENTIONS:  - Assess and monitor for signs and symptoms of infection  - Monitor lab/diagnostic results  - Monitor all insertion sites, i e  indwelling lines, tubes, and drains  - Monitor endotracheal if appropriate and nasal secretions for changes in amount and color  - [place] appropriate cooling/warming therapies per order  - Administer medications as ordered  - Instruct and encourage patient and family to use good hand hygiene technique  - Identify and instruct in appropriate isolation precautions for identified infection/condition  Outcome: Progressing  Goal: Absence of fever/infection during neutropenic period  Description: INTERVENTIONS:  - Monitor WBC    Outcome: Progressing     Problem: SAFETY ADULT  Goal: Patient will remain free of falls  Description: INTERVENTIONS:  - Assess patient frequently for physical needs  -  Identify cognitive and physical deficits and behaviors that affect risk of falls    -  [place] fall precautions as indicated by assessment   - Educate patient/family on patient safety including physical limitations  - Instruct patient to call for assistance with activity based on assessment  - Modify environment to reduce risk of injury  - Consider OT/PT consult to assist with strengthening/mobility  Outcome: Progressing  Goal: Maintain or return to baseline ADL function  Description: INTERVENTIONS:  -  Assess patient's ability to carry out ADLs; assess patient's baseline for ADL function and identify physical deficits which impact ability to perform ADLs (bathing, care of mouth/teeth, toileting, grooming, dressing, etc )  - Assess/evaluate cause of self-care deficits   - Assess range of motion  - Assess patient's mobility; develop plan if impaired  - Assess patient's need for assistive devices and provide as appropriate  - Encourage maximum independence but intervene and supervise when necessary  - Involve family in performance of ADLs  - Assess for home care needs following discharge   - Consider OT consult to assist with ADL evaluation and planning for discharge  - Provide patient education as appropriate  Outcome: Progressing  Goal: Maintain or return mobility status to optimal level  Description: INTERVENTIONS:  - Assess patient's baseline mobility status (ambulation, transfers, stairs, etc )    - Identify cognitive and physical deficits and behaviors that affect mobility  - Identify mobility aids required to assist with transfers and/or ambulation (gait belt, sit-to-stand, lift, walker, cane, etc )  - [place] fall precautions as indicated by assessment  - Record patient progress and toleration of activity level on Mobility SBAR; progress patient to next Phase/Stage  - Instruct patient to call for assistance with activity based on assessment  - Consider rehabilitation consult to assist with strengthening/weightbearing, etc   Outcome: Progressing     Problem: DISCHARGE PLANNING  Goal: Discharge to home or other facility with appropriate resources  Description: INTERVENTIONS:  - Identify barriers to discharge w/patient and caregiver  - Arrange for needed discharge resources and transportation as appropriate  - Identify discharge learning needs (meds, wound care, etc )  - Arrange for interpretive services to assist at discharge as needed  - Refer to Case Management Department for coordinating discharge planning if the patient needs post-hospital services based on physician/advanced practitioner order or complex needs related to functional status, cognitive ability, or social support system  Outcome: Progressing     Problem: Knowledge Deficit  Goal: Patient/family/caregiver demonstrates understanding of disease process, treatment plan, medications, and discharge instructions  Description: Complete learning assessment and assess knowledge base    Interventions:  - Provide teaching at level of understanding  - Provide teaching via preferred learning methods  Outcome: Progressing     Problem: RESPIRATORY - ADULT  Goal: Achieves optimal ventilation and oxygenation  Description: INTERVENTIONS:  - Assess for changes in respiratory status  - Assess for changes in mentation and behavior  - Position to facilitate oxygenation and minimize respiratory effort  - Oxygen administered by appropriate delivery if ordered  - Initiate smoking cessation education as indicated  - Encourage broncho-pulmonary hygiene including cough, deep breathe, Incentive Spirometry  - Assess the need for suctioning and aspirate as needed  - Assess and instruct to report SOB or any respiratory difficulty  - Respiratory Therapy support as indicated  Outcome: Progressing

## 2020-12-01 NOTE — ASSESSMENT & PLAN NOTE
Potassium 3 2, did have some intractable nausea and vomiting last night  Improved with repletion  Ordered for another KCl 40 milliequivalents p o

## 2020-12-01 NOTE — ASSESSMENT & PLAN NOTE
Patient reported abdominal/groin pain for three days with some hematuria/dysuria beginning yesterday  CT revealed mild circumferential bladder wall thickening, concern for superimposed cystitis  Urinalysis revealed nitrites, bacteria, blood      · Urine culture negative  · OK to discontinue rocephin

## 2020-12-02 ENCOUNTER — APPOINTMENT (INPATIENT)
Dept: RADIOLOGY | Facility: HOSPITAL | Age: 35
DRG: 446 | End: 2020-12-02
Payer: COMMERCIAL

## 2020-12-02 ENCOUNTER — ANESTHESIA (INPATIENT)
Dept: PERIOP | Facility: HOSPITAL | Age: 35
DRG: 446 | End: 2020-12-02
Payer: COMMERCIAL

## 2020-12-02 VITALS — HEART RATE: 104 BPM

## 2020-12-02 PROBLEM — E66.9 CLASS 1 OBESITY IN ADULT: Status: ACTIVE | Noted: 2020-12-02

## 2020-12-02 PROBLEM — J45.909 ASTHMA: Status: ACTIVE | Noted: 2020-12-02

## 2020-12-02 PROBLEM — F17.200 SMOKING: Status: ACTIVE | Noted: 2020-12-02

## 2020-12-02 PROCEDURE — 94760 N-INVAS EAR/PLS OXIMETRY 1: CPT

## 2020-12-02 PROCEDURE — 74018 RADEX ABDOMEN 1 VIEW: CPT

## 2020-12-02 PROCEDURE — BT1DYZZ FLUOROSCOPY OF RIGHT KIDNEY, URETER AND BLADDER USING OTHER CONTRAST: ICD-10-PCS | Performed by: SPECIALIST

## 2020-12-02 PROCEDURE — 82360 CALCULUS ASSAY QUANT: CPT | Performed by: SPECIALIST

## 2020-12-02 PROCEDURE — 94640 AIRWAY INHALATION TREATMENT: CPT

## 2020-12-02 PROCEDURE — 88300 SURGICAL PATH GROSS: CPT | Performed by: PATHOLOGY

## 2020-12-02 PROCEDURE — 99239 HOSP IP/OBS DSCHRG MGMT >30: CPT | Performed by: NURSE PRACTITIONER

## 2020-12-02 PROCEDURE — 0T768DZ DILATION OF RIGHT URETER WITH INTRALUMINAL DEVICE, VIA NATURAL OR ARTIFICIAL OPENING ENDOSCOPIC: ICD-10-PCS | Performed by: SPECIALIST

## 2020-12-02 PROCEDURE — 0TC68ZZ EXTIRPATION OF MATTER FROM RIGHT URETER, VIA NATURAL OR ARTIFICIAL OPENING ENDOSCOPIC: ICD-10-PCS | Performed by: SPECIALIST

## 2020-12-02 PROCEDURE — C2617 STENT, NON-COR, TEM W/O DEL: HCPCS | Performed by: SPECIALIST

## 2020-12-02 PROCEDURE — C1769 GUIDE WIRE: HCPCS | Performed by: SPECIALIST

## 2020-12-02 PROCEDURE — 0WHR8YZ INSERTION OF OTHER DEVICE INTO GENITOURINARY TRACT, VIA NATURAL OR ARTIFICIAL OPENING ENDOSCOPIC: ICD-10-PCS | Performed by: SPECIALIST

## 2020-12-02 DEVICE — INLAY URETERAL STENT W/O GUIDEWIRE
Type: IMPLANTABLE DEVICE | Status: NON-FUNCTIONAL
Brand: BARD® INLAY® URETERAL STENT
Removed: 2021-01-06

## 2020-12-02 RX ORDER — OXYCODONE HYDROCHLORIDE 5 MG/1
5 TABLET ORAL EVERY 4 HOURS PRN
Qty: 20 TABLET | Refills: 0 | Status: SHIPPED | OUTPATIENT
Start: 2020-12-02

## 2020-12-02 RX ORDER — SODIUM CHLORIDE, SODIUM LACTATE, POTASSIUM CHLORIDE, CALCIUM CHLORIDE 600; 310; 30; 20 MG/100ML; MG/100ML; MG/100ML; MG/100ML
INJECTION, SOLUTION INTRAVENOUS CONTINUOUS PRN
Status: DISCONTINUED | OUTPATIENT
Start: 2020-12-02 | End: 2020-12-02

## 2020-12-02 RX ORDER — MAGNESIUM HYDROXIDE 1200 MG/15ML
LIQUID ORAL AS NEEDED
Status: DISCONTINUED | OUTPATIENT
Start: 2020-12-02 | End: 2020-12-02 | Stop reason: HOSPADM

## 2020-12-02 RX ORDER — ONDANSETRON 2 MG/ML
4 INJECTION INTRAMUSCULAR; INTRAVENOUS ONCE AS NEEDED
Status: DISCONTINUED | OUTPATIENT
Start: 2020-12-02 | End: 2020-12-02 | Stop reason: HOSPADM

## 2020-12-02 RX ORDER — FENTANYL CITRATE/PF 50 MCG/ML
50 SYRINGE (ML) INJECTION
Status: DISCONTINUED | OUTPATIENT
Start: 2020-12-02 | End: 2020-12-02 | Stop reason: HOSPADM

## 2020-12-02 RX ORDER — ALBUTEROL SULFATE 2.5 MG/3ML
SOLUTION RESPIRATORY (INHALATION) AS NEEDED
Status: DISCONTINUED | OUTPATIENT
Start: 2020-12-02 | End: 2020-12-02

## 2020-12-02 RX ORDER — HYDROMORPHONE HCL/PF 1 MG/ML
0.5 SYRINGE (ML) INJECTION
Status: COMPLETED | OUTPATIENT
Start: 2020-12-02 | End: 2020-12-02

## 2020-12-02 RX ORDER — SODIUM CHLORIDE, SODIUM LACTATE, POTASSIUM CHLORIDE, CALCIUM CHLORIDE 600; 310; 30; 20 MG/100ML; MG/100ML; MG/100ML; MG/100ML
20 INJECTION, SOLUTION INTRAVENOUS CONTINUOUS
Status: DISCONTINUED | OUTPATIENT
Start: 2020-12-02 | End: 2020-12-02

## 2020-12-02 RX ORDER — TAMSULOSIN HYDROCHLORIDE 0.4 MG/1
0.4 CAPSULE ORAL
Qty: 30 CAPSULE | Refills: 0 | Status: SHIPPED | OUTPATIENT
Start: 2020-12-02 | End: 2020-12-31 | Stop reason: SDUPTHER

## 2020-12-02 RX ORDER — NICOTINE 21 MG/24HR
1 PATCH, TRANSDERMAL 24 HOURS TRANSDERMAL DAILY
Qty: 28 PATCH | Refills: 0 | Status: SHIPPED | OUTPATIENT
Start: 2020-12-03

## 2020-12-02 RX ORDER — FENTANYL CITRATE 50 UG/ML
INJECTION, SOLUTION INTRAMUSCULAR; INTRAVENOUS AS NEEDED
Status: DISCONTINUED | OUTPATIENT
Start: 2020-12-02 | End: 2020-12-02

## 2020-12-02 RX ORDER — DEXAMETHASONE SODIUM PHOSPHATE 4 MG/ML
INJECTION, SOLUTION INTRA-ARTICULAR; INTRALESIONAL; INTRAMUSCULAR; INTRAVENOUS; SOFT TISSUE AS NEEDED
Status: DISCONTINUED | OUTPATIENT
Start: 2020-12-02 | End: 2020-12-02

## 2020-12-02 RX ORDER — KETOROLAC TROMETHAMINE 30 MG/ML
15 INJECTION, SOLUTION INTRAMUSCULAR; INTRAVENOUS ONCE
Status: COMPLETED | OUTPATIENT
Start: 2020-12-02 | End: 2020-12-02

## 2020-12-02 RX ORDER — MIDAZOLAM HYDROCHLORIDE 2 MG/2ML
INJECTION, SOLUTION INTRAMUSCULAR; INTRAVENOUS AS NEEDED
Status: DISCONTINUED | OUTPATIENT
Start: 2020-12-02 | End: 2020-12-02

## 2020-12-02 RX ORDER — OXYCODONE HYDROCHLORIDE AND ACETAMINOPHEN 5; 325 MG/1; MG/1
1 TABLET ORAL EVERY 8 HOURS PRN
Qty: 6 TABLET | Refills: 0 | Status: CANCELLED | OUTPATIENT
Start: 2020-12-02

## 2020-12-02 RX ORDER — PROPOFOL 10 MG/ML
INJECTION, EMULSION INTRAVENOUS AS NEEDED
Status: DISCONTINUED | OUTPATIENT
Start: 2020-12-02 | End: 2020-12-02

## 2020-12-02 RX ORDER — KETOROLAC TROMETHAMINE 30 MG/ML
INJECTION, SOLUTION INTRAMUSCULAR; INTRAVENOUS AS NEEDED
Status: DISCONTINUED | OUTPATIENT
Start: 2020-12-02 | End: 2020-12-02

## 2020-12-02 RX ORDER — LANOLIN ALCOHOL/MO/W.PET/CERES
3 CREAM (GRAM) TOPICAL
Status: DISCONTINUED | OUTPATIENT
Start: 2020-12-02 | End: 2020-12-02 | Stop reason: HOSPADM

## 2020-12-02 RX ORDER — ONDANSETRON 2 MG/ML
INJECTION INTRAMUSCULAR; INTRAVENOUS AS NEEDED
Status: DISCONTINUED | OUTPATIENT
Start: 2020-12-02 | End: 2020-12-02

## 2020-12-02 RX ADMIN — DEXAMETHASONE SODIUM PHOSPHATE 8 MG: 4 INJECTION, SOLUTION INTRA-ARTICULAR; INTRALESIONAL; INTRAMUSCULAR; INTRAVENOUS; SOFT TISSUE at 08:55

## 2020-12-02 RX ADMIN — OXYCODONE HYDROCHLORIDE 5 MG: 5 TABLET ORAL at 05:12

## 2020-12-02 RX ADMIN — MIDAZOLAM 2 MG: 1 INJECTION INTRAMUSCULAR; INTRAVENOUS at 08:42

## 2020-12-02 RX ADMIN — TAMSULOSIN HYDROCHLORIDE 0.4 MG: 0.4 CAPSULE ORAL at 17:40

## 2020-12-02 RX ADMIN — HYDROMORPHONE HYDROCHLORIDE 0.5 MG: 1 INJECTION, SOLUTION INTRAMUSCULAR; INTRAVENOUS; SUBCUTANEOUS at 10:48

## 2020-12-02 RX ADMIN — PROPOFOL 200 MG: 10 INJECTION, EMULSION INTRAVENOUS at 08:50

## 2020-12-02 RX ADMIN — MELATONIN 3 MG: at 01:52

## 2020-12-02 RX ADMIN — OXYCODONE HYDROCHLORIDE 5 MG: 5 TABLET ORAL at 13:37

## 2020-12-02 RX ADMIN — ONDANSETRON 4 MG: 2 INJECTION INTRAMUSCULAR; INTRAVENOUS at 08:55

## 2020-12-02 RX ADMIN — FENTANYL CITRATE 50 MCG: 50 INJECTION INTRAMUSCULAR; INTRAVENOUS at 10:01

## 2020-12-02 RX ADMIN — FENTANYL CITRATE 50 MCG: 50 INJECTION, SOLUTION INTRAMUSCULAR; INTRAVENOUS at 08:49

## 2020-12-02 RX ADMIN — HYDROMORPHONE HYDROCHLORIDE 0.5 MG: 1 INJECTION, SOLUTION INTRAMUSCULAR; INTRAVENOUS; SUBCUTANEOUS at 11:32

## 2020-12-02 RX ADMIN — SODIUM CHLORIDE 200 ML/HR: 0.9 INJECTION, SOLUTION INTRAVENOUS at 01:53

## 2020-12-02 RX ADMIN — Medication 1 PATCH: at 12:17

## 2020-12-02 RX ADMIN — HYDROMORPHONE HYDROCHLORIDE 0.5 MG: 1 INJECTION, SOLUTION INTRAMUSCULAR; INTRAVENOUS; SUBCUTANEOUS at 15:29

## 2020-12-02 RX ADMIN — HYDROMORPHONE HYDROCHLORIDE 0.5 MG: 1 INJECTION, SOLUTION INTRAMUSCULAR; INTRAVENOUS; SUBCUTANEOUS at 10:58

## 2020-12-02 RX ADMIN — FENTANYL CITRATE 50 MCG: 50 INJECTION, SOLUTION INTRAMUSCULAR; INTRAVENOUS at 08:52

## 2020-12-02 RX ADMIN — KETOROLAC TROMETHAMINE 30 MG: 30 INJECTION, SOLUTION INTRAMUSCULAR at 09:31

## 2020-12-02 RX ADMIN — FENTANYL CITRATE 50 MCG: 50 INJECTION INTRAMUSCULAR; INTRAVENOUS at 10:16

## 2020-12-02 RX ADMIN — PROPOFOL 100 MG: 10 INJECTION, EMULSION INTRAVENOUS at 08:51

## 2020-12-02 RX ADMIN — SODIUM CHLORIDE 200 ML/HR: 0.9 INJECTION, SOLUTION INTRAVENOUS at 07:50

## 2020-12-02 RX ADMIN — HYDROMORPHONE HYDROCHLORIDE 0.5 MG: 1 INJECTION, SOLUTION INTRAMUSCULAR; INTRAVENOUS; SUBCUTANEOUS at 17:33

## 2020-12-02 RX ADMIN — SODIUM CHLORIDE, SODIUM LACTATE, POTASSIUM CHLORIDE, AND CALCIUM CHLORIDE: .6; .31; .03; .02 INJECTION, SOLUTION INTRAVENOUS at 08:34

## 2020-12-02 RX ADMIN — ALBUTEROL SULFATE 2.5 MG: 2.5 SOLUTION RESPIRATORY (INHALATION) at 13:41

## 2020-12-02 RX ADMIN — HYDROMORPHONE HYDROCHLORIDE 0.5 MG: 1 INJECTION, SOLUTION INTRAMUSCULAR; INTRAVENOUS; SUBCUTANEOUS at 11:26

## 2020-12-02 RX ADMIN — FENTANYL CITRATE 50 MCG: 50 INJECTION, SOLUTION INTRAMUSCULAR; INTRAVENOUS at 09:35

## 2020-12-02 RX ADMIN — KETOROLAC TROMETHAMINE 15 MG: 30 INJECTION, SOLUTION INTRAMUSCULAR at 12:57

## 2020-12-02 RX ADMIN — FENTANYL CITRATE 50 MCG: 50 INJECTION, SOLUTION INTRAMUSCULAR; INTRAVENOUS at 09:42

## 2020-12-02 RX ADMIN — HYDROMORPHONE HYDROCHLORIDE 0.5 MG: 1 INJECTION, SOLUTION INTRAMUSCULAR; INTRAVENOUS; SUBCUTANEOUS at 03:35

## 2020-12-02 RX ADMIN — ALBUTEROL SULFATE 2.5 MG: 2.5 SOLUTION RESPIRATORY (INHALATION) at 08:23

## 2020-12-02 NOTE — OP NOTE
OPERATIVE REPORT  PATIENT NAME: Yesenia Ziegler    :  1985  MRN: [id number]  Pt Location: [facility] OR ROOM 04    SURGERY DATE: 2020    Surgeon(s) and Role:     * Jak Gonzalez MD - Primary    Preop Diagnosis:  Ureteral stone [N20 1]  Right 3 mm distal ureteral stone    Post-Op Diagnosis Codes:     * Ureteral stone [N20 1]  Right 5 mm distal ureteral stone  Procedure(s) (LRB):  CYSTOSCOPY URETEROSCOPY WITH LITHOTRIPSY HOLMIUM LASER, RETROGRADE PYELOGRAM, STONE BASKET RETRIEVAL, URETHRAL DILATION  AND INSERTION STENT URETERAL (Right)    Specimen(s):  ID Type Source Tests Collected by Time Destination   1 :  Calculus Ureter, Right STONE ANALYSIS, TISSUE EXAM Jak Gonzalez MD 2020 [id number]        Estimated Blood Loss:   Minimal    Drains:  Ureteral Drain/Stent Right ureter 6 Fr  (Active)   Number of days: 0       Anesthesia Type:   General/LMA    Operative Indications:  Ureteral stone [N20 1]  This 42-year-old male presented to the emergency room at [facility] night November 29th with worsening right flank pain starting Thanksgiving Day November 26th spiral CT scan confirmed a 3 mm stone in the right distal ureter he was seen at the bedside on  and attempted medical expulsion therapy for the last 2 days with analgesics tamsulosin straining urine KUB ordered report results pending from yesterday patient still needs IV analgesics at this point to undergo cysto retrograde possible ureteroscopy basket and/or laser stent placement    Operative Findings:  1  2 5 cm prostatic urethra  2   Edematous somewhat stenotic right orifice requiring ureteroscopy to pass a wire with 5 mm stone noted proximal to the stenosis and edema requiring laser lithotripsy to 2 mm fragments to remove with basket 24 cm 6 [place] stent past  Complications:   None    Procedure and Technique  After patient identified in holding area right side marked consent signed placed in the op suite after anesthesia was induced he was placed in thigh position draped prep standard fashion  Time-out performed  Twenty 2 [place] cystoscope with 30 lens attempted to pass through the meatus with difficulty the meatus was dilated up until 25 [place] male sounds  The cystoscope was then easily placed anterior showed no other abnormalities posterior the confirmed a 2 5 cm bilobar open prostatic urethra  As the scope was inserted to the bladder lumen the right orifice was noted to be somewhat stenotic and edematous attempts at passing a 0 038 wire was unsuccessful attempts with 5 [place] catheter unsuccessful the scope was removed and the semi rigid ureteral scope was then placed into the orifice with edema and stenosis noted 0 038 wire was passed up into the right renal pelvis and backloaded off left a safety the ureteral scope was then placed with stenosis noted just proximal to the right ureterovesical junction the stone was encountered which now appeared to be 5 mm and definitely could not be removed with the basket in light of the distal stenosis the holmium laser was placed in the stone was broken into multiple 1-2 mm fragments and with a basket removed the cystoscope replaced over wire 20 4 cm 6 [place] stent was passed wire removed scope removed under fluoroscopic control postop procedure well was awakened taken recovery room stable condition left stent out a week     I was present for the entire procedure    Patient Disposition:  PACU     SIGNATURE: Jak Gonzalez MD  DATE: December 2, 2020  TIME: 9:41 AM

## 2020-12-02 NOTE — DISCHARGE SUMMARY
Discharge- Tavo Tony 1985, 38 y o  male MRN: [id number]    Unit/Bed#: [address] [place] [address] Encounter: [id number]    Primary Care Provider: No primary care provider on file  Date and time admitted to hospital: 11/29/2020  8:48 PM        * Ureteral stone  Assessment & Plan  Patient presented to the ED with complaints of right sided abdominal/groin pain  CT revealed right sided nephrolithiasis, 3 mm obstructing stone in the distal right ureter with mild right hydroureteronephrosis proximal to this  [address]  urology input   POD #0 s/p cystoscopy with ureteroscopy with lithotripsy holmium laser, retrograde pyelogram, stone basket retrieval, ureteral dilation and right ureteral stent insertion    Urinating well   Eating and drinking well   Will discharge home with outpatient urology follow-up in 1 -2 weeks    Oxycodone 5 mg po PRN for pain   Flomax 0 4 mg daily     Cystitis  Assessment & Plan  Patient reported abdominal/groin pain for three days with some hematuria/dysuria beginning yesterday  CT revealed mild circumferential bladder wall thickening, concern for superimposed cystitis  Urinalysis revealed nitrites, bacteria, blood  · Urine culture negative  · OK to discontinue rocephin    Class 1 obesity in adult  Assessment & Plan  Would benefit from weight loss     Asthma  Assessment & Plan  Encourage tobacco cessation  Albuterol INH PRN     Tobacco dependence  Assessment & Plan  Continue nicoderm patch    Hypokalemia  Assessment & Plan  Repleted  K+ improved to 4 2  Encourage adequate oral intake       Discharging Physician / Practitioner: SANA Mccollum  PCP: No primary care provider on file    Admission Date:   Admission Orders (From admission, onward)     Ordered        12/01/20 1419  Inpatient Admission  Once         11/29/20 2324  Place in Observation  Once                   Discharge Date: 12/02/20    Resolved Problems  Date Reviewed: 12/2/2020    None Consultations During Hospital Stay:  · Urology     Procedures Performed:   · CT A/P: Normal caliber appendix  Right-sided nephrolithiasis   3 mm obstructing stone in the distal right ureter with mild right hydroureteronephrosis proximal to this  Bladder is partially distended   Mild circumferential bladder wall thickening noted, possibly exaggerated by underdistention   Superimposed cystitis could be considered in the appropriate clinical setting   Correlation with the patient's symptoms,   laboratory values, and urinalysis recommended  Colonic diverticulosis without evidence of acute diverticulitis  Fatty infiltration of the liver is suspected   In the setting of abdominal pain and/or elevated liver function tests, consider steatohepatitis      · KUB: 4 mm calcification on the right side of the pelvis concerning for a distal ureteral calculus  · Repeat KUB: pending final read    Significant Findings / Test Results:   · Right-sided nephrolithiasis with 3 mm obstructing stone in the distal right ureter with mild right hydroureteronephrosis proximal to this     Incidental Findings:   · As above     Test Results Pending at Discharge (will require follow up): · None     Outpatient Tests Requested:  · None    Complications:  None    Reason for Admission: right sided abdominal pain with ureteral stone     Hospital Course:     Osorio Maurer is a 38 y o  male patient who originally presented to the hospital on 11/29/2020 due to right-sided abdominal/ groin pain  Workup revealed right-sided nephrolithiasis, 3 mm obstructing stone in the distal right ureter with mild right hydroureteronephrosis  Patient was started on Rocephin for dysuria  Urine culture showed no growth  Patient underwent cystoscopy with ureteroscopy with lithotripsy holmium laser, retrograde pyelogram, stone basket retrieval, ureteral dilation and right ureteral stent insertion on 12/2/2020       Patient to be discharged home with Flomax and Oxycodone PRN (reviewed medication prices via [employer])  Patient to follow-up with urology as an outpatient  Please see above list of diagnoses and related plan for additional information  Condition at Discharge: stable     Discharge Day Visit / Exam:     Subjective:  Resting in bed  Talkative  Denies HA, dizziness, CP, SOB, N/V/D  Continues with RLQ/right flank pain but desires discharge home tonight because he has a ride tonight and not tomorrow  We discussed a pain medication regimen for discharge  He is aware he is to call Dr Abdi Schwartz is pain persists for 2-3 days  Vitals: Blood Pressure: 140/84 (12/02/20 1500)  Pulse: 95 (12/02/20 1500)  Temperature: 98 5 °F (36 9 °C) (12/02/20 1500)  Temp Source: Oral (12/01/20 1912)  Respirations: 20 (12/02/20 1500)  Weight - Scale: 101 kg (222 lb) (11/29/20 2043)  SpO2: 96 % (12/02/20 1500)  Exam:   Physical Exam  Vitals signs and nursing note reviewed  Constitutional:       Appearance: He is well-developed  He is not ill-appearing or diaphoretic  Comments: Pleasant, talkative    HENT:      Head: Normocephalic and atraumatic  Eyes:      Conjunctiva/sclera: Conjunctivae normal    Neck:      Musculoskeletal: Neck supple  Cardiovascular:      Rate and Rhythm: Normal rate and regular rhythm  Heart sounds: No murmur  Pulmonary:      Effort: Pulmonary effort is normal  No respiratory distress  Abdominal:      Palpations: Abdomen is soft  Tenderness: There is abdominal tenderness (mild, RLQ/right flank )  Genitourinary:     Comments: Hematuria noted in urinal   Skin:     General: Skin is warm and dry  Capillary Refill: Capillary refill takes less than 2 seconds  Neurological:      General: No focal deficit present  Mental Status: He is alert and oriented to person, place, and time  Psychiatric:         Mood and Affect: Mood normal          Behavior: Behavior normal          Thought Content:  Thought content normal  Discussion with Family: Patient declines     Discharge instructions/Information to patient and family:   See after visit summary for information provided to patient and family  Provisions for Follow-Up Care:  See after visit summary for information related to follow-up care and any pertinent home health orders  Disposition:     Home    For Discharges to [address] [facility]:   · Not Applicable to this Patient - Not Applicable to this Patient    Planned Readmission: None     Discharge Statement:  I spent > 30 minutes discharging the patient  This time was spent on the day of discharge  I had direct contact with the patient on the day of discharge  Greater than 50% of the total time was spent examining patient, answering all patient questions, arranging and discussing plan of care with patient as well as directly providing post-discharge instructions  Additional time then spent on discharge activities  Discharge Medications:  See after visit summary for reconciled discharge medications provided to patient and family        ** Please Note: This note has been constructed using a voice recognition system **

## 2020-12-03 VITALS
HEART RATE: 75 BPM | DIASTOLIC BLOOD PRESSURE: 68 MMHG | WEIGHT: 222 LBS | OXYGEN SATURATION: 93 % | TEMPERATURE: 98 F | RESPIRATION RATE: 22 BRPM | SYSTOLIC BLOOD PRESSURE: 132 MMHG | BODY MASS INDEX: 34.77 KG/M2

## 2020-12-14 LAB
COLOR STONE: NORMAL
COM MFR STONE: 100 %
COMMENT-STONE3: NORMAL
COMPOSITION: NORMAL
LABORATORY COMMENT REPORT: NORMAL
PHOTO: NORMAL
SIZE STONE: NORMAL MM
SPEC SOURCE SUBJ: NORMAL
STONE ANALYSIS-IMP: NORMAL
WT STONE: 4 MG

## 2020-12-15 ENCOUNTER — APPOINTMENT (EMERGENCY)
Dept: RADIOLOGY | Facility: HOSPITAL | Age: 35
End: 2020-12-15
Payer: COMMERCIAL

## 2020-12-15 ENCOUNTER — HOSPITAL ENCOUNTER (EMERGENCY)
Facility: HOSPITAL | Age: 35
Discharge: HOME | End: 2020-12-15
Attending: EMERGENCY MEDICINE | Admitting: EMERGENCY MEDICINE
Payer: COMMERCIAL

## 2020-12-15 VITALS
SYSTOLIC BLOOD PRESSURE: 110 MMHG | DIASTOLIC BLOOD PRESSURE: 53 MMHG | RESPIRATION RATE: 19 BRPM | HEART RATE: 80 BPM | TEMPERATURE: 98.6 F | OXYGEN SATURATION: 98 % | BODY MASS INDEX: 34.46 KG/M2 | WEIGHT: 220 LBS

## 2020-12-15 DIAGNOSIS — N12 PYELONEPHRITIS: ICD-10-CM

## 2020-12-15 DIAGNOSIS — N20.0 KIDNEY STONE: ICD-10-CM

## 2020-12-15 LAB
ALBUMIN SERPL BCP-MCNC: 4.1 G/DL (ref 3.5–5)
ALP SERPL-CCNC: 79 U/L (ref 46–116)
ALT SERPL W P-5'-P-CCNC: 39 U/L (ref 12–78)
AMORPH URATE CRY URNS QL MICRO: ABNORMAL /HPF
ANION GAP SERPL CALCULATED.3IONS-SCNC: 6 MMOL/L (ref 4–13)
AST SERPL W P-5'-P-CCNC: 21 U/L (ref 5–45)
BACTERIA UR QL AUTO: ABNORMAL /HPF
BASOPHILS # BLD AUTO: 0.04 THOUSANDS/ΜL (ref 0–0.1)
BASOPHILS NFR BLD AUTO: 0 % (ref 0–1)
BILIRUB SERPL-MCNC: 0.4 MG/DL (ref 0.2–1)
BILIRUB UR QL STRIP: NEGATIVE
BUN SERPL-MCNC: 16 MG/DL (ref 5–25)
CALCIUM SERPL-MCNC: 8.9 MG/DL (ref 8.3–10.1)
CHLORIDE SERPL-SCNC: 102 MMOL/L (ref 100–108)
CLARITY UR: ABNORMAL
CO2 SERPL-SCNC: 30 MMOL/L (ref 21–32)
COLOR UR: ABNORMAL
CREAT SERPL-MCNC: 1.36 MG/DL (ref 0.6–1.3)
EOSINOPHIL # BLD AUTO: 0.24 THOUSAND/ΜL (ref 0–0.61)
EOSINOPHIL NFR BLD AUTO: 2 % (ref 0–6)
ERYTHROCYTE [DISTWIDTH] IN BLOOD BY AUTOMATED COUNT: 11.7 % (ref 11.6–15.1)
GFR SERPL CREATININE-BSD FRML MDRD: 77 ML/MIN/1.73SQ M
GLUCOSE SERPL-MCNC: 90 MG/DL (ref 65–140)
GLUCOSE UR STRIP-MCNC: NEGATIVE MG/DL
HCT VFR BLD AUTO: 46 % (ref 36.5–49.3)
HGB BLD-MCNC: 14.5 G/DL (ref 12–17)
HGB UR QL STRIP.AUTO: ABNORMAL
HYALINE CASTS #/AREA URNS LPF: ABNORMAL /LPF
IMM GRANULOCYTES # BLD AUTO: 0.04 THOUSAND/UL (ref 0–0.2)
IMM GRANULOCYTES NFR BLD AUTO: 0 % (ref 0–2)
KETONES UR STRIP-MCNC: ABNORMAL MG/DL
LEUKOCYTE ESTERASE UR QL STRIP: ABNORMAL
LIPASE SERPL-CCNC: 165 U/L (ref 73–393)
LYMPHOCYTES # BLD AUTO: 4.39 THOUSANDS/ΜL (ref 0.6–4.47)
LYMPHOCYTES NFR BLD AUTO: 33 % (ref 14–44)
MAGNESIUM SERPL-MCNC: 2.4 MG/DL (ref 1.6–2.6)
MCH RBC QN AUTO: 29.4 PG (ref 26.8–34.3)
MCHC RBC AUTO-ENTMCNC: 31.5 G/DL (ref 31.4–37.4)
MCV RBC AUTO: 93 FL (ref 82–98)
MONOCYTES # BLD AUTO: 1.11 THOUSAND/ΜL (ref 0.17–1.22)
MONOCYTES NFR BLD AUTO: 8 % (ref 4–12)
MUCOUS THREADS UR QL AUTO: ABNORMAL
NEUTROPHILS # BLD AUTO: 7.34 THOUSANDS/ΜL (ref 1.85–7.62)
NEUTS SEG NFR BLD AUTO: 57 % (ref 43–75)
NITRITE UR QL STRIP: NEGATIVE
NON-SQ EPI CELLS URNS QL MICRO: ABNORMAL /HPF
NRBC BLD AUTO-RTO: 0 /100 WBCS
OTHER STN SPEC: ABNORMAL
PH UR STRIP.AUTO: 5.5 [PH]
PLATELET # BLD AUTO: 356 THOUSANDS/UL (ref 149–390)
PMV BLD AUTO: 10.3 FL (ref 8.9–12.7)
POTASSIUM SERPL-SCNC: 3.7 MMOL/L (ref 3.5–5.3)
PROT SERPL-MCNC: 8.1 G/DL (ref 6.4–8.2)
PROT UR STRIP-MCNC: ABNORMAL MG/DL
RBC # BLD AUTO: 4.94 MILLION/UL (ref 3.88–5.62)
RBC #/AREA URNS AUTO: ABNORMAL /HPF
SODIUM SERPL-SCNC: 138 MMOL/L (ref 136–145)
SP GR UR STRIP.AUTO: >=1.03 (ref 1–1.03)
UROBILINOGEN UR QL STRIP.AUTO: 0.2 E.U./DL
WBC # BLD AUTO: 13.16 THOUSAND/UL (ref 4.31–10.16)
WBC #/AREA URNS AUTO: ABNORMAL /HPF

## 2020-12-15 PROCEDURE — 87086 URINE CULTURE/COLONY COUNT: CPT | Performed by: EMERGENCY MEDICINE

## 2020-12-15 PROCEDURE — 96365 THER/PROPH/DIAG IV INF INIT: CPT

## 2020-12-15 PROCEDURE — 74176 CT ABD & PELVIS W/O CONTRAST: CPT

## 2020-12-15 PROCEDURE — 36415 COLL VENOUS BLD VENIPUNCTURE: CPT | Performed by: EMERGENCY MEDICINE

## 2020-12-15 PROCEDURE — 96361 HYDRATE IV INFUSION ADD-ON: CPT

## 2020-12-15 PROCEDURE — 80053 COMPREHEN METABOLIC PANEL: CPT | Performed by: EMERGENCY MEDICINE

## 2020-12-15 PROCEDURE — 81001 URINALYSIS AUTO W/SCOPE: CPT | Performed by: EMERGENCY MEDICINE

## 2020-12-15 PROCEDURE — 85025 COMPLETE CBC W/AUTO DIFF WBC: CPT | Performed by: EMERGENCY MEDICINE

## 2020-12-15 PROCEDURE — 99285 EMERGENCY DEPT VISIT HI MDM: CPT | Performed by: EMERGENCY MEDICINE

## 2020-12-15 PROCEDURE — 83690 ASSAY OF LIPASE: CPT | Performed by: EMERGENCY MEDICINE

## 2020-12-15 PROCEDURE — 99284 EMERGENCY DEPT VISIT MOD MDM: CPT

## 2020-12-15 PROCEDURE — 96375 TX/PRO/DX INJ NEW DRUG ADDON: CPT

## 2020-12-15 PROCEDURE — 83735 ASSAY OF MAGNESIUM: CPT | Performed by: EMERGENCY MEDICINE

## 2020-12-15 PROCEDURE — G1004 CDSM NDSC: HCPCS

## 2020-12-15 RX ORDER — ONDANSETRON 4 MG/1
4 TABLET, ORALLY DISINTEGRATING ORAL EVERY 6 HOURS PRN
Qty: 12 TABLET | Refills: 0 | Status: SHIPPED | OUTPATIENT
Start: 2020-12-15 | End: 2021-04-11

## 2020-12-15 RX ORDER — CEFTRIAXONE 1 G/50ML
1000 INJECTION, SOLUTION INTRAVENOUS ONCE
Status: COMPLETED | OUTPATIENT
Start: 2020-12-15 | End: 2020-12-15

## 2020-12-15 RX ORDER — ONDANSETRON 2 MG/ML
4 INJECTION INTRAMUSCULAR; INTRAVENOUS ONCE
Status: COMPLETED | OUTPATIENT
Start: 2020-12-15 | End: 2020-12-15

## 2020-12-15 RX ORDER — MORPHINE SULFATE 4 MG/ML
4 INJECTION, SOLUTION INTRAMUSCULAR; INTRAVENOUS ONCE
Status: COMPLETED | OUTPATIENT
Start: 2020-12-15 | End: 2020-12-15

## 2020-12-15 RX ORDER — OXYCODONE AND ACETAMINOPHEN 7.5; 325 MG/1; MG/1
1 TABLET ORAL EVERY 4 HOURS PRN
Qty: 12 TABLET | Refills: 0 | Status: SHIPPED | OUTPATIENT
Start: 2020-12-15 | End: 2020-12-19

## 2020-12-15 RX ORDER — SULFAMETHOXAZOLE AND TRIMETHOPRIM 800; 160 MG/1; MG/1
1 TABLET ORAL 2 TIMES DAILY
Qty: 14 TABLET | Refills: 0 | Status: SHIPPED | OUTPATIENT
Start: 2020-12-15 | End: 2020-12-22

## 2020-12-15 RX ORDER — OXYCODONE HYDROCHLORIDE AND ACETAMINOPHEN 5; 325 MG/1; MG/1
1 TABLET ORAL ONCE
Status: COMPLETED | OUTPATIENT
Start: 2020-12-15 | End: 2020-12-15

## 2020-12-15 RX ORDER — HYDROMORPHONE HCL/PF 1 MG/ML
1 SYRINGE (ML) INJECTION ONCE
Status: COMPLETED | OUTPATIENT
Start: 2020-12-15 | End: 2020-12-15

## 2020-12-15 RX ORDER — KETOROLAC TROMETHAMINE 30 MG/ML
15 INJECTION, SOLUTION INTRAMUSCULAR; INTRAVENOUS ONCE
Status: COMPLETED | OUTPATIENT
Start: 2020-12-15 | End: 2020-12-15

## 2020-12-15 RX ORDER — HYDROCODONE BITARTRATE AND ACETAMINOPHEN 5; 325 MG/1; MG/1
1 TABLET ORAL EVERY 6 HOURS PRN
Qty: 12 TABLET | Refills: 0 | Status: SHIPPED | OUTPATIENT
Start: 2020-12-15 | End: 2020-12-15

## 2020-12-15 RX ADMIN — HYDROMORPHONE HYDROCHLORIDE 1 MG: 1 INJECTION, SOLUTION INTRAMUSCULAR; INTRAVENOUS; SUBCUTANEOUS at 02:38

## 2020-12-15 RX ADMIN — CEFTRIAXONE 1000 MG: 1 INJECTION, SOLUTION INTRAVENOUS at 02:38

## 2020-12-15 RX ADMIN — KETOROLAC TROMETHAMINE 15 MG: 30 INJECTION, SOLUTION INTRAMUSCULAR at 01:19

## 2020-12-15 RX ADMIN — ONDANSETRON 4 MG: 2 INJECTION INTRAMUSCULAR; INTRAVENOUS at 01:19

## 2020-12-15 RX ADMIN — OXYCODONE HYDROCHLORIDE AND ACETAMINOPHEN 1 TABLET: 5; 325 TABLET ORAL at 06:00

## 2020-12-15 RX ADMIN — SODIUM CHLORIDE 1000 ML: 0.9 INJECTION, SOLUTION INTRAVENOUS at 01:18

## 2020-12-15 RX ADMIN — MORPHINE SULFATE 4 MG: 4 INJECTION INTRAVENOUS at 01:20

## 2020-12-16 LAB — BACTERIA UR CULT: NORMAL

## 2020-12-28 ENCOUNTER — HOSPITAL ENCOUNTER (EMERGENCY)
Facility: HOSPITAL | Age: 35
Discharge: HOME | End: 2020-12-28
Attending: EMERGENCY MEDICINE
Payer: COMMERCIAL

## 2020-12-28 ENCOUNTER — TELEPHONE (OUTPATIENT)
Dept: OTHER | Facility: HOSPITAL | Age: 35
End: 2020-12-28

## 2020-12-28 VITALS
DIASTOLIC BLOOD PRESSURE: 72 MMHG | WEIGHT: 224.65 LBS | TEMPERATURE: 98.1 F | SYSTOLIC BLOOD PRESSURE: 133 MMHG | HEART RATE: 88 BPM | OXYGEN SATURATION: 98 % | BODY MASS INDEX: 35.18 KG/M2 | RESPIRATION RATE: 17 BRPM

## 2020-12-28 DIAGNOSIS — N20.0 KIDNEY STONE: ICD-10-CM

## 2020-12-28 DIAGNOSIS — T83.84XA PAIN DUE TO URETERAL STENT, INITIAL ENCOUNTER (HCC): Primary | ICD-10-CM

## 2020-12-28 DIAGNOSIS — R31.9 HEMATURIA: ICD-10-CM

## 2020-12-28 LAB
ALBUMIN SERPL BCP-MCNC: 4.2 G/DL (ref 3.5–5)
ALP SERPL-CCNC: 89 U/L (ref 46–116)
ALT SERPL W P-5'-P-CCNC: 37 U/L (ref 12–78)
ANION GAP SERPL CALCULATED.3IONS-SCNC: 7 MMOL/L (ref 4–13)
AST SERPL W P-5'-P-CCNC: 21 U/L (ref 5–45)
BACTERIA UR QL AUTO: ABNORMAL /HPF
BASOPHILS # BLD AUTO: 0.02 THOUSANDS/ΜL (ref 0–0.1)
BASOPHILS NFR BLD AUTO: 0 % (ref 0–1)
BILIRUB SERPL-MCNC: 0.2 MG/DL (ref 0.2–1)
BILIRUB UR QL STRIP: NEGATIVE
BUN SERPL-MCNC: 12 MG/DL (ref 5–25)
CALCIUM SERPL-MCNC: 9.3 MG/DL (ref 8.3–10.1)
CHLORIDE SERPL-SCNC: 103 MMOL/L (ref 100–108)
CLARITY UR: ABNORMAL
CO2 SERPL-SCNC: 28 MMOL/L (ref 21–32)
COLOR UR: YELLOW
CREAT SERPL-MCNC: 1.14 MG/DL (ref 0.6–1.3)
EOSINOPHIL # BLD AUTO: 0.22 THOUSAND/ΜL (ref 0–0.61)
EOSINOPHIL NFR BLD AUTO: 3 % (ref 0–6)
ERYTHROCYTE [DISTWIDTH] IN BLOOD BY AUTOMATED COUNT: 11.7 % (ref 11.6–15.1)
GFR SERPL CREATININE-BSD FRML MDRD: 95 ML/MIN/1.73SQ M
GLUCOSE SERPL-MCNC: 85 MG/DL (ref 65–140)
GLUCOSE UR STRIP-MCNC: NEGATIVE MG/DL
HCT VFR BLD AUTO: 45.6 % (ref 36.5–49.3)
HGB BLD-MCNC: 14.3 G/DL (ref 12–17)
HGB UR QL STRIP.AUTO: ABNORMAL
IMM GRANULOCYTES # BLD AUTO: 0.01 THOUSAND/UL (ref 0–0.2)
IMM GRANULOCYTES NFR BLD AUTO: 0 % (ref 0–2)
KETONES UR STRIP-MCNC: NEGATIVE MG/DL
LEUKOCYTE ESTERASE UR QL STRIP: ABNORMAL
LYMPHOCYTES # BLD AUTO: 3.39 THOUSANDS/ΜL (ref 0.6–4.47)
LYMPHOCYTES NFR BLD AUTO: 44 % (ref 14–44)
MCH RBC QN AUTO: 29.2 PG (ref 26.8–34.3)
MCHC RBC AUTO-ENTMCNC: 31.4 G/DL (ref 31.4–37.4)
MCV RBC AUTO: 93 FL (ref 82–98)
MONOCYTES # BLD AUTO: 0.58 THOUSAND/ΜL (ref 0.17–1.22)
MONOCYTES NFR BLD AUTO: 8 % (ref 4–12)
MUCOUS THREADS UR QL AUTO: ABNORMAL
NEUTROPHILS # BLD AUTO: 3.45 THOUSANDS/ΜL (ref 1.85–7.62)
NEUTS SEG NFR BLD AUTO: 45 % (ref 43–75)
NITRITE UR QL STRIP: NEGATIVE
NON-SQ EPI CELLS URNS QL MICRO: ABNORMAL /HPF
NRBC BLD AUTO-RTO: 0 /100 WBCS
PH UR STRIP.AUTO: 6 [PH]
PLATELET # BLD AUTO: 350 THOUSANDS/UL (ref 149–390)
PMV BLD AUTO: 10.1 FL (ref 8.9–12.7)
POTASSIUM SERPL-SCNC: 4 MMOL/L (ref 3.5–5.3)
PROT SERPL-MCNC: 8.1 G/DL (ref 6.4–8.2)
PROT UR STRIP-MCNC: ABNORMAL MG/DL
RBC # BLD AUTO: 4.9 MILLION/UL (ref 3.88–5.62)
RBC #/AREA URNS AUTO: ABNORMAL /HPF
SODIUM SERPL-SCNC: 138 MMOL/L (ref 136–145)
SP GR UR STRIP.AUTO: >=1.03 (ref 1–1.03)
UROBILINOGEN UR QL STRIP.AUTO: 0.2 E.U./DL
WBC # BLD AUTO: 7.67 THOUSAND/UL (ref 4.31–10.16)
WBC #/AREA URNS AUTO: ABNORMAL /HPF

## 2020-12-28 PROCEDURE — 96375 TX/PRO/DX INJ NEW DRUG ADDON: CPT

## 2020-12-28 PROCEDURE — 87086 URINE CULTURE/COLONY COUNT: CPT | Performed by: EMERGENCY MEDICINE

## 2020-12-28 PROCEDURE — 96361 HYDRATE IV INFUSION ADD-ON: CPT

## 2020-12-28 PROCEDURE — 99285 EMERGENCY DEPT VISIT HI MDM: CPT | Performed by: EMERGENCY MEDICINE

## 2020-12-28 PROCEDURE — 96374 THER/PROPH/DIAG INJ IV PUSH: CPT

## 2020-12-28 PROCEDURE — 81001 URINALYSIS AUTO W/SCOPE: CPT | Performed by: EMERGENCY MEDICINE

## 2020-12-28 PROCEDURE — 80053 COMPREHEN METABOLIC PANEL: CPT | Performed by: EMERGENCY MEDICINE

## 2020-12-28 PROCEDURE — 85025 COMPLETE CBC W/AUTO DIFF WBC: CPT | Performed by: EMERGENCY MEDICINE

## 2020-12-28 PROCEDURE — 99284 EMERGENCY DEPT VISIT MOD MDM: CPT

## 2020-12-28 PROCEDURE — 36415 COLL VENOUS BLD VENIPUNCTURE: CPT | Performed by: EMERGENCY MEDICINE

## 2020-12-28 RX ORDER — OXYCODONE HYDROCHLORIDE AND ACETAMINOPHEN 5; 325 MG/1; MG/1
1 TABLET ORAL ONCE
Status: COMPLETED | OUTPATIENT
Start: 2020-12-28 | End: 2020-12-28

## 2020-12-28 RX ORDER — ONDANSETRON 2 MG/ML
4 INJECTION INTRAMUSCULAR; INTRAVENOUS ONCE
Status: COMPLETED | OUTPATIENT
Start: 2020-12-28 | End: 2020-12-28

## 2020-12-28 RX ORDER — KETOROLAC TROMETHAMINE 30 MG/ML
30 INJECTION, SOLUTION INTRAMUSCULAR; INTRAVENOUS ONCE
Status: COMPLETED | OUTPATIENT
Start: 2020-12-28 | End: 2020-12-28

## 2020-12-28 RX ORDER — TAMSULOSIN HYDROCHLORIDE 0.4 MG/1
0.4 CAPSULE ORAL
Qty: 10 CAPSULE | Refills: 0 | Status: SHIPPED | OUTPATIENT
Start: 2020-12-28 | End: 2020-12-31 | Stop reason: SDUPTHER

## 2020-12-28 RX ORDER — TRAMADOL HYDROCHLORIDE 50 MG/1
TABLET ORAL
Qty: 20 TABLET | Refills: 0 | Status: SHIPPED | OUTPATIENT
Start: 2020-12-28 | End: 2021-01-07 | Stop reason: HOSPADM

## 2020-12-28 RX ADMIN — SODIUM CHLORIDE 1000 ML: 0.9 INJECTION, SOLUTION INTRAVENOUS at 17:51

## 2020-12-28 RX ADMIN — ONDANSETRON 4 MG: 2 INJECTION INTRAMUSCULAR; INTRAVENOUS at 18:34

## 2020-12-28 RX ADMIN — KETOROLAC TROMETHAMINE 30 MG: 30 INJECTION, SOLUTION INTRAMUSCULAR at 18:35

## 2020-12-28 RX ADMIN — MORPHINE SULFATE 2 MG: 2 INJECTION, SOLUTION INTRAMUSCULAR; INTRAVENOUS at 18:34

## 2020-12-28 RX ADMIN — OXYCODONE HYDROCHLORIDE AND ACETAMINOPHEN 1 TABLET: 5; 325 TABLET ORAL at 20:41

## 2020-12-29 LAB — BACTERIA UR CULT: NORMAL

## 2020-12-31 ENCOUNTER — OFFICE VISIT (OUTPATIENT)
Dept: UROLOGY | Facility: AMBULATORY SURGERY CENTER | Age: 35
End: 2020-12-31
Payer: COMMERCIAL

## 2020-12-31 VITALS
BODY MASS INDEX: 35.47 KG/M2 | DIASTOLIC BLOOD PRESSURE: 92 MMHG | HEIGHT: 67 IN | SYSTOLIC BLOOD PRESSURE: 140 MMHG | WEIGHT: 226 LBS | HEART RATE: 95 BPM

## 2020-12-31 DIAGNOSIS — N20.0 KIDNEY STONE: ICD-10-CM

## 2020-12-31 DIAGNOSIS — N20.1 URETERAL STONE: Primary | ICD-10-CM

## 2020-12-31 LAB
SL AMB  POCT GLUCOSE, UA: NORMAL
SL AMB LEUKOCYTE ESTERASE,UA: NORMAL
SL AMB POCT BILIRUBIN,UA: NORMAL
SL AMB POCT BLOOD,UA: NORMAL
SL AMB POCT CLARITY,UA: NORMAL
SL AMB POCT COLOR,UA: YELLOW
SL AMB POCT KETONES,UA: NORMAL
SL AMB POCT NITRITE,UA: NORMAL
SL AMB POCT PH,UA: 6
SL AMB POCT SPECIFIC GRAVITY,UA: 1.01
SL AMB POCT URINE PROTEIN: NORMAL
SL AMB POCT UROBILINOGEN: NORMAL

## 2020-12-31 PROCEDURE — 81002 URINALYSIS NONAUTO W/O SCOPE: CPT | Performed by: NURSE PRACTITIONER

## 2020-12-31 PROCEDURE — 99204 OFFICE O/P NEW MOD 45 MIN: CPT | Performed by: NURSE PRACTITIONER

## 2020-12-31 RX ORDER — TAMSULOSIN HYDROCHLORIDE 0.4 MG/1
0.4 CAPSULE ORAL
Qty: 30 CAPSULE | Refills: 0 | Status: SHIPPED | OUTPATIENT
Start: 2020-12-31 | End: 2021-01-07 | Stop reason: HOSPADM

## 2020-12-31 RX ORDER — CEFAZOLIN SODIUM 2 G/50ML
2000 SOLUTION INTRAVENOUS ONCE
Status: CANCELLED | OUTPATIENT
Start: 2020-12-31 | End: 2020-12-31

## 2020-12-31 RX ORDER — OXYBUTYNIN CHLORIDE 5 MG/1
5 TABLET ORAL 3 TIMES DAILY PRN
Qty: 30 TABLET | Refills: 1 | Status: SHIPPED | OUTPATIENT
Start: 2020-12-31 | End: 2021-01-07 | Stop reason: HOSPADM

## 2021-01-04 ENCOUNTER — APPOINTMENT (EMERGENCY)
Dept: CT IMAGING | Facility: HOSPITAL | Age: 36
End: 2021-01-04
Payer: COMMERCIAL

## 2021-01-04 ENCOUNTER — HOSPITAL ENCOUNTER (OUTPATIENT)
Facility: HOSPITAL | Age: 36
Setting detail: OBSERVATION
Discharge: HOME | End: 2021-01-07
Attending: EMERGENCY MEDICINE | Admitting: INTERNAL MEDICINE
Payer: COMMERCIAL

## 2021-01-04 DIAGNOSIS — K64.5 EXTERNAL HEMORRHOID, THROMBOSED: Primary | ICD-10-CM

## 2021-01-04 DIAGNOSIS — T83.84XA PAIN DUE TO URETERAL STENT, INITIAL ENCOUNTER (HCC): ICD-10-CM

## 2021-01-04 DIAGNOSIS — R10.31 COLICKY RLQ ABDOMINAL PAIN: ICD-10-CM

## 2021-01-04 DIAGNOSIS — K59.03 DRUG-INDUCED CONSTIPATION: ICD-10-CM

## 2021-01-04 LAB
ANION GAP SERPL CALCULATED.3IONS-SCNC: 7 MMOL/L (ref 4–13)
BACTERIA UR QL AUTO: ABNORMAL /HPF
BASOPHILS # BLD AUTO: 0.01 THOUSANDS/ΜL (ref 0–0.1)
BASOPHILS NFR BLD AUTO: 0 % (ref 0–1)
BILIRUB UR QL STRIP: NEGATIVE
BUN SERPL-MCNC: 16 MG/DL (ref 5–25)
CALCIUM SERPL-MCNC: 8.8 MG/DL (ref 8.3–10.1)
CHLORIDE SERPL-SCNC: 103 MMOL/L (ref 100–108)
CLARITY UR: ABNORMAL
CO2 SERPL-SCNC: 26 MMOL/L (ref 21–32)
COLOR UR: YELLOW
CREAT SERPL-MCNC: 1.03 MG/DL (ref 0.6–1.3)
EOSINOPHIL # BLD AUTO: 0.31 THOUSAND/ΜL (ref 0–0.61)
EOSINOPHIL NFR BLD AUTO: 4 % (ref 0–6)
ERYTHROCYTE [DISTWIDTH] IN BLOOD BY AUTOMATED COUNT: 12.1 % (ref 11.6–15.1)
GFR SERPL CREATININE-BSD FRML MDRD: 108 ML/MIN/1.73SQ M
GLUCOSE SERPL-MCNC: 95 MG/DL (ref 65–140)
GLUCOSE UR STRIP-MCNC: NEGATIVE MG/DL
HCT VFR BLD AUTO: 39.7 % (ref 36.5–49.3)
HGB BLD-MCNC: 12.7 G/DL (ref 12–17)
HGB UR QL STRIP.AUTO: ABNORMAL
IMM GRANULOCYTES # BLD AUTO: 0.02 THOUSAND/UL (ref 0–0.2)
IMM GRANULOCYTES NFR BLD AUTO: 0 % (ref 0–2)
KETONES UR STRIP-MCNC: NEGATIVE MG/DL
LEUKOCYTE ESTERASE UR QL STRIP: ABNORMAL
LYMPHOCYTES # BLD AUTO: 3.67 THOUSANDS/ΜL (ref 0.6–4.47)
LYMPHOCYTES NFR BLD AUTO: 47 % (ref 14–44)
MCH RBC QN AUTO: 29.9 PG (ref 26.8–34.3)
MCHC RBC AUTO-ENTMCNC: 32 G/DL (ref 31.4–37.4)
MCV RBC AUTO: 93 FL (ref 82–98)
MONOCYTES # BLD AUTO: 0.74 THOUSAND/ΜL (ref 0.17–1.22)
MONOCYTES NFR BLD AUTO: 9 % (ref 4–12)
NEUTROPHILS # BLD AUTO: 3.22 THOUSANDS/ΜL (ref 1.85–7.62)
NEUTS SEG NFR BLD AUTO: 40 % (ref 43–75)
NITRITE UR QL STRIP: NEGATIVE
NON-SQ EPI CELLS URNS QL MICRO: ABNORMAL /HPF
NRBC BLD AUTO-RTO: 0 /100 WBCS
PH UR STRIP.AUTO: 6 [PH]
PLATELET # BLD AUTO: 308 THOUSANDS/UL (ref 149–390)
PMV BLD AUTO: 10.3 FL (ref 8.9–12.7)
POTASSIUM SERPL-SCNC: 4.1 MMOL/L (ref 3.5–5.3)
PROT UR STRIP-MCNC: ABNORMAL MG/DL
RBC # BLD AUTO: 4.25 MILLION/UL (ref 3.88–5.62)
RBC #/AREA URNS AUTO: ABNORMAL /HPF
SODIUM SERPL-SCNC: 136 MMOL/L (ref 136–145)
SP GR UR STRIP.AUTO: >=1.03 (ref 1–1.03)
UROBILINOGEN UR QL STRIP.AUTO: 0.2 E.U./DL
WBC # BLD AUTO: 7.97 THOUSAND/UL (ref 4.31–10.16)
WBC #/AREA URNS AUTO: ABNORMAL /HPF

## 2021-01-04 PROCEDURE — 96374 THER/PROPH/DIAG INJ IV PUSH: CPT

## 2021-01-04 PROCEDURE — 96361 HYDRATE IV INFUSION ADD-ON: CPT

## 2021-01-04 PROCEDURE — 96376 TX/PRO/DX INJ SAME DRUG ADON: CPT

## 2021-01-04 PROCEDURE — G1004 CDSM NDSC: HCPCS

## 2021-01-04 PROCEDURE — 99285 EMERGENCY DEPT VISIT HI MDM: CPT | Performed by: EMERGENCY MEDICINE

## 2021-01-04 PROCEDURE — 85025 COMPLETE CBC W/AUTO DIFF WBC: CPT | Performed by: EMERGENCY MEDICINE

## 2021-01-04 PROCEDURE — 87086 URINE CULTURE/COLONY COUNT: CPT | Performed by: EMERGENCY MEDICINE

## 2021-01-04 PROCEDURE — 80048 BASIC METABOLIC PNL TOTAL CA: CPT | Performed by: EMERGENCY MEDICINE

## 2021-01-04 PROCEDURE — 99285 EMERGENCY DEPT VISIT HI MDM: CPT

## 2021-01-04 PROCEDURE — 81001 URINALYSIS AUTO W/SCOPE: CPT | Performed by: EMERGENCY MEDICINE

## 2021-01-04 PROCEDURE — 74176 CT ABD & PELVIS W/O CONTRAST: CPT

## 2021-01-04 PROCEDURE — 99219 PR INITIAL OBSERVATION CARE/DAY 50 MINUTES: CPT | Performed by: PHYSICIAN ASSISTANT

## 2021-01-04 PROCEDURE — 96375 TX/PRO/DX INJ NEW DRUG ADDON: CPT

## 2021-01-04 PROCEDURE — 36415 COLL VENOUS BLD VENIPUNCTURE: CPT | Performed by: EMERGENCY MEDICINE

## 2021-01-04 RX ORDER — LIDOCAINE HYDROCHLORIDE 10 MG/ML
10 INJECTION, SOLUTION EPIDURAL; INFILTRATION; INTRACAUDAL; PERINEURAL ONCE
Status: COMPLETED | OUTPATIENT
Start: 2021-01-04 | End: 2021-01-04

## 2021-01-04 RX ORDER — NAPROXEN 500 MG/1
500 TABLET ORAL 2 TIMES DAILY WITH MEALS
Qty: 30 TABLET | Refills: 0 | Status: SHIPPED | OUTPATIENT
Start: 2021-01-04

## 2021-01-04 RX ORDER — TAMSULOSIN HYDROCHLORIDE 0.4 MG/1
0.4 CAPSULE ORAL
Status: DISCONTINUED | OUTPATIENT
Start: 2021-01-05 | End: 2021-01-07 | Stop reason: HOSPADM

## 2021-01-04 RX ORDER — ATROPA BELLADONNA AND OPIUM 16.2; 3 MG/1; MG/1
30 SUPPOSITORY RECTAL EVERY 8 HOURS PRN
Status: DISCONTINUED | OUTPATIENT
Start: 2021-01-04 | End: 2021-01-07 | Stop reason: HOSPADM

## 2021-01-04 RX ORDER — MORPHINE SULFATE 4 MG/ML
4 INJECTION, SOLUTION INTRAMUSCULAR; INTRAVENOUS ONCE
Status: COMPLETED | OUTPATIENT
Start: 2021-01-04 | End: 2021-01-04

## 2021-01-04 RX ORDER — PHENAZOPYRIDINE HYDROCHLORIDE 100 MG/1
100 TABLET, FILM COATED ORAL ONCE
Status: COMPLETED | OUTPATIENT
Start: 2021-01-04 | End: 2021-01-04

## 2021-01-04 RX ORDER — ALBUTEROL SULFATE 90 UG/1
2 AEROSOL, METERED RESPIRATORY (INHALATION) EVERY 4 HOURS PRN
Status: DISCONTINUED | OUTPATIENT
Start: 2021-01-04 | End: 2021-01-07 | Stop reason: HOSPADM

## 2021-01-04 RX ORDER — PHENAZOPYRIDINE HYDROCHLORIDE 200 MG/1
200 TABLET, FILM COATED ORAL 3 TIMES DAILY
Qty: 6 TABLET | Refills: 0 | Status: SHIPPED | OUTPATIENT
Start: 2021-01-04

## 2021-01-04 RX ORDER — MORPHINE SULFATE 4 MG/ML
4 INJECTION, SOLUTION INTRAMUSCULAR; INTRAVENOUS EVERY 4 HOURS PRN
Status: DISCONTINUED | OUTPATIENT
Start: 2021-01-04 | End: 2021-01-07 | Stop reason: HOSPADM

## 2021-01-04 RX ORDER — TAMSULOSIN HYDROCHLORIDE 0.4 MG/1
0.4 CAPSULE ORAL ONCE
Status: COMPLETED | OUTPATIENT
Start: 2021-01-04 | End: 2021-01-04

## 2021-01-04 RX ORDER — TAMSULOSIN HYDROCHLORIDE 0.4 MG/1
0.4 CAPSULE ORAL
Qty: 14 CAPSULE | Refills: 0 | Status: SHIPPED | OUTPATIENT
Start: 2021-01-04 | End: 2021-01-18

## 2021-01-04 RX ORDER — LORAZEPAM 2 MG/ML
0.5 INJECTION INTRAMUSCULAR ONCE
Status: COMPLETED | OUTPATIENT
Start: 2021-01-04 | End: 2021-01-04

## 2021-01-04 RX ORDER — HYDROMORPHONE HCL/PF 1 MG/ML
0.5 SYRINGE (ML) INJECTION ONCE
Status: DISCONTINUED | OUTPATIENT
Start: 2021-01-04 | End: 2021-01-04

## 2021-01-04 RX ORDER — DOCUSATE SODIUM 100 MG/1
100 CAPSULE, LIQUID FILLED ORAL EVERY 12 HOURS
Qty: 60 CAPSULE | Refills: 0 | Status: SHIPPED | OUTPATIENT
Start: 2021-01-04

## 2021-01-04 RX ORDER — SODIUM CHLORIDE 9 MG/ML
125 INJECTION, SOLUTION INTRAVENOUS CONTINUOUS
Status: DISCONTINUED | OUTPATIENT
Start: 2021-01-04 | End: 2021-01-07 | Stop reason: HOSPADM

## 2021-01-04 RX ORDER — DOCUSATE SODIUM 100 MG/1
100 CAPSULE, LIQUID FILLED ORAL ONCE
Status: COMPLETED | OUTPATIENT
Start: 2021-01-04 | End: 2021-01-04

## 2021-01-04 RX ORDER — ONDANSETRON 2 MG/ML
4 INJECTION INTRAMUSCULAR; INTRAVENOUS EVERY 6 HOURS PRN
Status: DISCONTINUED | OUTPATIENT
Start: 2021-01-04 | End: 2021-01-07 | Stop reason: HOSPADM

## 2021-01-04 RX ORDER — ACETAMINOPHEN 325 MG/1
650 TABLET ORAL EVERY 6 HOURS PRN
Status: DISCONTINUED | OUTPATIENT
Start: 2021-01-04 | End: 2021-01-07 | Stop reason: HOSPADM

## 2021-01-04 RX ORDER — HYDROCODONE BITARTRATE AND ACETAMINOPHEN 5; 325 MG/1; MG/1
1 TABLET ORAL EVERY 6 HOURS PRN
Qty: 10 TABLET | Refills: 0 | Status: SHIPPED | OUTPATIENT
Start: 2021-01-04 | End: 2021-01-14

## 2021-01-04 RX ORDER — KETOROLAC TROMETHAMINE 30 MG/ML
30 INJECTION, SOLUTION INTRAMUSCULAR; INTRAVENOUS EVERY 6 HOURS SCHEDULED
Status: DISCONTINUED | OUTPATIENT
Start: 2021-01-05 | End: 2021-01-06 | Stop reason: SDUPTHER

## 2021-01-04 RX ORDER — NICOTINE 21 MG/24HR
1 PATCH, TRANSDERMAL 24 HOURS TRANSDERMAL DAILY
Status: DISCONTINUED | OUTPATIENT
Start: 2021-01-05 | End: 2021-01-07 | Stop reason: HOSPADM

## 2021-01-04 RX ORDER — POLYETHYLENE GLYCOL 3350 17 G/17G
17 POWDER, FOR SOLUTION ORAL DAILY
Qty: 510 G | Refills: 0 | Status: SHIPPED | OUTPATIENT
Start: 2021-01-04 | End: 2021-02-03

## 2021-01-04 RX ORDER — OXYBUTYNIN CHLORIDE 5 MG/1
5 TABLET ORAL 4 TIMES DAILY
Status: DISCONTINUED | OUTPATIENT
Start: 2021-01-04 | End: 2021-01-07 | Stop reason: HOSPADM

## 2021-01-04 RX ORDER — KETOROLAC TROMETHAMINE 30 MG/ML
15 INJECTION, SOLUTION INTRAMUSCULAR; INTRAVENOUS ONCE
Status: COMPLETED | OUTPATIENT
Start: 2021-01-04 | End: 2021-01-04

## 2021-01-04 RX ORDER — OXYBUTYNIN CHLORIDE 5 MG/1
5 TABLET ORAL ONCE
Status: COMPLETED | OUTPATIENT
Start: 2021-01-04 | End: 2021-01-04

## 2021-01-04 RX ORDER — OXYBUTYNIN CHLORIDE 5 MG/1
5 TABLET ORAL 3 TIMES DAILY
Status: DISCONTINUED | OUTPATIENT
Start: 2021-01-04 | End: 2021-01-04

## 2021-01-04 RX ORDER — HYDROMORPHONE HCL/PF 1 MG/ML
1 SYRINGE (ML) INJECTION ONCE
Status: COMPLETED | OUTPATIENT
Start: 2021-01-04 | End: 2021-01-04

## 2021-01-04 RX ADMIN — KETOROLAC TROMETHAMINE 15 MG: 30 INJECTION, SOLUTION INTRAMUSCULAR at 16:56

## 2021-01-04 RX ADMIN — PHENAZOPYRIDINE 100 MG: 100 TABLET ORAL at 16:56

## 2021-01-04 RX ADMIN — MORPHINE SULFATE 4 MG: 4 INJECTION INTRAVENOUS at 20:05

## 2021-01-04 RX ADMIN — SODIUM CHLORIDE 1000 ML: 0.9 INJECTION, SOLUTION INTRAVENOUS at 17:00

## 2021-01-04 RX ADMIN — LORAZEPAM 0.5 MG: 2 INJECTION INTRAMUSCULAR; INTRAVENOUS at 13:18

## 2021-01-04 RX ADMIN — MORPHINE SULFATE 4 MG: 4 INJECTION INTRAVENOUS at 14:33

## 2021-01-04 RX ADMIN — MORPHINE SULFATE 4 MG: 4 INJECTION INTRAVENOUS at 13:18

## 2021-01-04 RX ADMIN — SODIUM CHLORIDE 125 ML/HR: 0.9 INJECTION, SOLUTION INTRAVENOUS at 21:34

## 2021-01-04 RX ADMIN — LIDOCAINE HYDROCHLORIDE 10 ML: 10 INJECTION, SOLUTION EPIDURAL; INFILTRATION; INTRACAUDAL; PERINEURAL at 15:29

## 2021-01-04 RX ADMIN — OXYBUTYNIN CHLORIDE 5 MG: 5 TABLET ORAL at 21:34

## 2021-01-04 RX ADMIN — HYDROMORPHONE HYDROCHLORIDE 1 MG: 1 INJECTION, SOLUTION INTRAMUSCULAR; INTRAVENOUS; SUBCUTANEOUS at 15:26

## 2021-01-04 RX ADMIN — DOCUSATE SODIUM 100 MG: 100 CAPSULE, LIQUID FILLED ORAL at 16:56

## 2021-01-04 RX ADMIN — OXYBUTYNIN CHLORIDE 5 MG: 5 TABLET ORAL at 18:41

## 2021-01-04 RX ADMIN — TAMSULOSIN HYDROCHLORIDE 0.4 MG: 0.4 CAPSULE ORAL at 16:56

## 2021-01-04 NOTE — CONSULTS
Consultation - Colorectal Surgery   Aldhuhuntiqualek Lynnl Mooring 38 y o  male MRN: [id number]  Unit/Bed#: ED 26 Encounter: [id number]    Assessment/Plan   Assessment:  39 yo male with thrombosed external hemorrhoids  Plan:  Lanced thrombosed hemorrhoid   Patient instructed on care for wound, sitz baths after bowel movements  Patient does not have constipation, but strains with urination, therefore he was advised to discuss options with Urology  Patient can follow up in clinic in one month with Dr Chris Sarmiento, in his Colorectal Surgery office      History of Present Illness   HPI:  Joshua Merrill is a 38 y o  male who presents with history of urolithiasis s/p lithotripsy, ureteral dilation and stent insertion 12/2 whose post op course has been complicated by stent colic and straining with urination, who presents with thrombosed external hemorrhoids  Patient was straining to urinate last night when he suddenly began experiencing some anal pain  The pain was intolerable today which is why he came to the ED  The patient denied any fevers, chills, constipation, or prior hemorrhoids  Inpatient consult to Colorectal Surgery     Performed by  Ken Sharp MD     Authorized by Rosemary Whyte MD              Review of Systems  10/14 systems reviewed and negative except those mentioned in the HPI        Historical Information   Past Medical History:   Diagnosis Date    Asthma      Past Surgical History:   Procedure Laterality Date    JOINT REPLACEMENT Right     knee    KNEE SURGERY      [place] CYSTO/URETERO W/LITHOTRIPSY &INDWELL STENT INSRT Right 12/2/2020    Procedure: CYSTOSCOPY URETEROSCOPY WITH LITHOTRIPSY HOLMIUM LASER, RETROGRADE PYELOGRAM, STONE BASKET RETRIEVAL, URETHRAL DILATION  AND INSERTION STENT URETERAL;  Surgeon: Ashley Rodriguez MD;  Location: [facility] OR;  Service: Urology    SHOULDER SURGERY Right      Social History   Social History     Substance and Sexual Activity   Alcohol Use Not Currently Social History     Substance and Sexual Activity   Drug Use Never     E-Cigarette/Vaping    E-Cigarette Use Never User      E-Cigarette/Vaping Substances     Social History     Tobacco Use   Smoking Status Current Every Day Smoker    Packs/day: 0 50    Types: Cigarettes   Smokeless Tobacco Never Used     Family History: History reviewed  No pertinent family history  Meds/Allergies   current meds:   Current Facility-Administered Medications   Medication Dose Route Frequency    docusate sodium (COLACE) capsule 100 mg  100 mg Oral Once    ketorolac (TORADOL) injection 15 mg  15 mg Intravenous Once    oxybutynin (DITROPAN) tablet 5 mg  5 mg Oral Once    phenazopyridine (PYRIDIUM) tablet 100 mg  100 mg Oral Once    tamsulosin (FLOMAX) capsule 0 4 mg  0 4 mg Oral Once    and PTA meds:   Prior to Admission Medications   Prescriptions Last Dose Informant Patient Reported? Taking?    albuterol (PROVENTIL HFA,VENTOLIN HFA) 90 mcg/act inhaler  Self Yes No   Sig: Inhale 2 puffs every 4 (four) hours as needed   naproxen (NAPROSYN) 500 mg tablet  Self Yes No   Sig: Take 1 tablet by mouth 2 (two) times a day   nicotine (NICODERM CQ) 14 mg/24hr TD 24 hr patch  Self No No   Sig: Place 1 patch on the skin daily   Patient not taking: Reported on 12/31/2020   ondansetron (ZOFRAN-ODT) 4 mg disintegrating tablet  Self No No   Sig: Take 1 tablet (4 mg total) by mouth every 6 (six) hours as needed for nausea or vomiting for up to 3 days   oxyCODONE (ROXICODONE) 5 mg immediate release tablet  Self No No   Sig: Take 1 tablet (5 mg total) by mouth every 4 (four) hours as needed for moderate pain for up to 20 dosesMax Daily Amount: 30 mg   Patient not taking: Reported on 12/28/2020   oxybutynin (DITROPAN) 5 mg tablet   No No   Sig: Take 1 tablet (5 mg total) by mouth 3 (three) times a day as needed (as needed)   tamsulosin (FLOMAX) 0 4 mg   No No   Sig: Take 1 capsule (0 4 mg total) by mouth daily with dinner   traMADol (ULTRAM) 50 mg tablet  Self No No   Si-2 po q 6 hours prn pain   Patient not taking: Reported on 2020      Facility-Administered Medications: None     Allergies   Allergen Reactions    Latex     Levofloxacin     Penicillins     Rocephin [Ceftriaxone]     Shellfish-Derived Products     Coconut Oil Rash       Objective   First Vitals:   Blood Pressure: 154/81 (21 111)  Pulse: 96 (21 1112)  Temperature: 97 9 °F (36 6 °C) (21 111)  Temp Source: Oral (21 111)  Respirations: 18 (21 111)  Weight - Scale: 99 8 kg (220 lb) (21 111)  SpO2: 100 % (21)    Current Vitals:   Blood Pressure: 154/81 (21)  Pulse: 96 (21 111)  Temperature: 97 9 °F (36 6 °C) (21)  Temp Source: Oral (21)  Respirations: 18 (21 111)  Weight - Scale: 99 8 kg (220 lb) (21 111)  SpO2: 100 % (21 111)    No intake or output data in the 24 hours ending 21 1458    Invasive Devices     Drain            Ureteral Drain/Stent Right ureter 6 Fr  33 days                Physical Exam  Vitals signs and nursing note reviewed  Constitutional:       General: He is not in acute distress  Appearance: Normal appearance  He is well-developed  HENT:      Head: Normocephalic and atraumatic  Right Ear: External ear normal       Left Ear: External ear normal       Nose: Nose normal       Mouth/Throat:      Mouth: Mucous membranes are moist       Pharynx: Oropharynx is clear  Eyes:      General: No scleral icterus  Extraocular Movements: Extraocular movements intact  Conjunctiva/sclera: Conjunctivae normal       Pupils: Pupils are equal, round, and reactive to light  Neck:      Musculoskeletal: Normal range of motion and neck supple  Vascular: No JVD  Trachea: No tracheal deviation  Cardiovascular:      Rate and Rhythm: Normal rate and regular rhythm  Pulses: Normal pulses     Pulmonary:      Effort: Pulmonary effort is normal  No respiratory distress  Chest:      Chest wall: No tenderness  Abdominal:      General: There is no distension  Palpations: Abdomen is soft  Tenderness: There is no abdominal tenderness  Genitourinary:     Comments: External exam reveals a dominant large thrombosed hemorrhoid in the left lateral position as well as 2 much smaller adjacent external hemorrhoids, exquisitely tender on exam, did not tolerate NATALEE, no evidence of skin tags of fissures     Musculoskeletal: Normal range of motion  General: No swelling or tenderness  Skin:     General: Skin is warm and dry  Capillary Refill: Capillary refill takes less than 2 seconds  Neurological:      General: No focal deficit present  Mental Status: He is alert and oriented to person, place, and time  Cranial Nerves: No cranial nerve deficit  Sensory: No sensory deficit  Psychiatric:         Mood and Affect: Mood normal          Behavior: Behavior normal          Thought Content:  Thought content normal          Judgment: Judgment normal          Lab Results:   CBC:   Lab Results   Component Value Date    WBC 7 97 01/04/2021    HGB 12 7 01/04/2021    HCT 39 7 01/04/2021    MCV 93 01/04/2021     01/04/2021    MCH 29 9 01/04/2021    MCHC 32 0 01/04/2021    RDW 12 1 01/04/2021    MPV 10 3 01/04/2021    NRBC 0 01/04/2021   , CMP:   Lab Results   Component Value Date    SODIUM 136 01/04/2021    K 4 1 01/04/2021     01/04/2021    CO2 26 01/04/2021    BUN 16 01/04/2021    CREATININE 1 03 01/04/2021    CALCIUM 8 8 01/04/2021    EGFR 108 01/04/2021   , Coagulation: No results found for: PT, INR, APTT, Urinalysis:   Lab Results   Component Value Date    COLORU Yellow 01/04/2021    CLARITYU Slightly Cloudy 01/04/2021    SPECGRAV >=1 030 01/04/2021    PHUR 6 0 01/04/2021    LEUKOCYTESUR Small (A) 01/04/2021    NITRITE Negative 01/04/2021    GLUCOSEU Negative 01/04/2021    KETONESU Negative 01/04/2021 BILIRUBINUR Negative 01/04/2021    BLOODU Small (A) 01/04/2021   , Amylase: No results found for: AMYLASE, Lipase: No results found for: LIPASE  Imaging: I have personally reviewed pertinent reports  EKG, Pathology, and Other Studies: I have personally reviewed pertinent reports

## 2021-01-04 NOTE — PROCEDURES
Incision and drain    Date/Time: 1/4/2021 4:14 PM  Performed by: Emi Ferguson MD  Authorized by: Emi Ferguson MD   Universal Protocol:  Procedure performed by:  Consent: Verbal consent obtained  Risks and benefits: risks, benefits and alternatives were discussed  Consent given by: patient  Time out: Immediately prior to procedure a "time out" was called to verify the correct patient, procedure, equipment, support staff and site/side marked as required  Timeout called at: 1/4/2021 4:15 PM   Patient understanding: patient states understanding of the procedure being performed  Relevant documents: relevant documents present and verified  Site marked: the operative site was marked  Required items: required blood products, implants, devices, and special equipment available  Patient identity confirmed: verbally with patient and arm band      Patient location:  ED  Location:     Indications for incision and drainage: Thrombosed External Hemorrhoid  Location:  Anogenital    Anogenital location:  Perianal  Pre-procedure details:     Skin preparation:  Chloraprep  Sedation:     Sedation type: Anxiolysis  Anesthesia (see MAR for exact dosages): Anesthesia method:  Local infiltration    Local anesthetic:  Lidocaine 1% w/o epi  Procedure details:     Complexity:  Simple    Incision types:  Stab incision    Scalpel blade:  11    Approach:  Puncture    Incision depth:  Subcutaneous    Wound management:  Probed and deloculated    Drainage:  Bloody    Drainage amount: Moderate    Wound treatment:  Wound left open    Packing materials:  None  Post-procedure details:     Patient tolerance of procedure:   Tolerated well, no immediate complications

## 2021-01-04 NOTE — ED PROVIDER NOTES
History  Chief Complaint   Patient presents with    Rectal Pain     patient reports having prolapsed rectum last night  + blood in urine after stent placed on 12/2  told by urology to come if still having complications    Blood in Urine     HPI   68-year-old male presenting to the emergency department with acute onset of severe rectal pain and protrusion after straining so hard from having severe urinary retention/dysuria  Past medical history is significant for recent right ureteral stent placement by Urology for kidney stones  Patient is reportedly going to have the stent removed on the 11th  Stent was placed during the last week of December  Since stent placement, patient has had severe dysuria, hematuria, right flank pain, urinary hesitancy, abnormal urinary stream   Patient has been in touch with Urology repeatedly during the course of these weeks  Patient denies fever, chills, headache, chest pain, shortness of breath, cough, sore throat, nausea, vomiting, leg pain or leg swelling  Patient's biggest concern at this point in time is the amount of pain he has in his rectum  Prior to Admission Medications   Prescriptions Last Dose Informant Patient Reported? Taking?    albuterol (PROVENTIL HFA,VENTOLIN HFA) 90 mcg/act inhaler  Self Yes No   Sig: Inhale 2 puffs every 4 (four) hours as needed   naproxen (NAPROSYN) 500 mg tablet  Self Yes No   Sig: Take 1 tablet by mouth 2 (two) times a day   nicotine (NICODERM CQ) 14 mg/24hr TD 24 hr patch  Self No No   Sig: Place 1 patch on the skin daily   Patient not taking: Reported on 12/31/2020   ondansetron (ZOFRAN-ODT) 4 mg disintegrating tablet  Self No No   Sig: Take 1 tablet (4 mg total) by mouth every 6 (six) hours as needed for nausea or vomiting for up to 3 days   oxyCODONE (ROXICODONE) 5 mg immediate release tablet  Self No No   Sig: Take 1 tablet (5 mg total) by mouth every 4 (four) hours as needed for moderate pain for up to 20 dosesMax Daily Amount: 30 mg   Patient not taking: Reported on 2020   oxybutynin (DITROPAN) 5 mg tablet   No No   Sig: Take 1 tablet (5 mg total) by mouth 3 (three) times a day as needed (as needed)   tamsulosin (FLOMAX) 0 4 mg   No No   Sig: Take 1 capsule (0 4 mg total) by mouth daily with dinner   traMADol (ULTRAM) 50 mg tablet  Self No No   Si-2 po q 6 hours prn pain   Patient not taking: Reported on 2020      Facility-Administered Medications: None       Past Medical History:   Diagnosis Date    Asthma        Past Surgical History:   Procedure Laterality Date    JOINT REPLACEMENT Right     knee    KNEE SURGERY      [place] CYSTO/URETERO W/LITHOTRIPSY &INDWELL STENT INSRT Right 2020    Procedure: CYSTOSCOPY URETEROSCOPY WITH LITHOTRIPSY HOLMIUM LASER, RETROGRADE PYELOGRAM, STONE BASKET RETRIEVAL, URETHRAL DILATION  AND INSERTION STENT URETERAL;  Surgeon: Mary Higginbotham MD;  Location: [address] [place] [address];  Service: Urology    SHOULDER SURGERY Right        History reviewed  No pertinent family history  I have reviewed and agree with the history as documented  E-Cigarette/Vaping    E-Cigarette Use Never User      E-Cigarette/Vaping Substances     Social History     Tobacco Use    Smoking status: Current Every Day Smoker     Packs/day: 0 50     Types: Cigarettes    Smokeless tobacco: Never Used   Substance Use Topics    Alcohol use: Not Currently    Drug use: Never       Review of Systems   Constitutional: Negative for activity change, chills and fever  HENT: Negative for sneezing and sore throat  Eyes: Negative for pain  Respiratory: Negative for apnea, cough, choking, chest tightness, shortness of breath, wheezing and stridor  Cardiovascular: Negative for chest pain, palpitations and leg swelling  Gastrointestinal: Positive for rectal pain  Negative for abdominal distention, abdominal pain, anal bleeding, blood in stool, constipation, diarrhea, nausea and vomiting     Genitourinary: Positive for decreased urine volume, difficulty urinating, dysuria, flank pain, frequency, hematuria and urgency  Negative for discharge, enuresis, genital sores, penile pain, penile swelling, scrotal swelling and testicular pain  Musculoskeletal: Negative for back pain, gait problem, myalgias, neck pain and neck stiffness  Skin: Negative for color change, pallor, rash and wound  Neurological: Negative for dizziness, tremors, seizures, syncope, facial asymmetry, speech difficulty, weakness, light-headedness, numbness and headaches  Physical Exam  Physical Exam  Vitals signs and nursing note reviewed  Constitutional:       General: He is not in acute distress  Appearance: He is well-developed  He is not diaphoretic  HENT:      Head: Normocephalic and atraumatic  Right Ear: External ear normal       Left Ear: External ear normal       Nose: Nose normal    Eyes:      General:         Right eye: No discharge  Left eye: No discharge  Conjunctiva/sclera: Conjunctivae normal       Pupils: Pupils are equal, round, and reactive to light  Neck:      Musculoskeletal: Normal range of motion and neck supple  Cardiovascular:      Rate and Rhythm: Normal rate and regular rhythm  Heart sounds: Normal heart sounds  No murmur  No friction rub  No gallop  Pulmonary:      Effort: Pulmonary effort is normal  No respiratory distress  Breath sounds: Normal breath sounds  No stridor  No wheezing or rales  Chest:      Chest wall: No tenderness  Abdominal:      General: Bowel sounds are normal  There is no distension  Palpations: Abdomen is soft  There is no mass  Tenderness: There is no abdominal tenderness  There is right CVA tenderness and guarding  There is no rebound  Hernia: No hernia is present  Genitourinary:     Comments: Patient has a large hemorrhoid, see media tab  Musculoskeletal: Normal range of motion  General: No tenderness or deformity     Skin:     General: Skin is warm and dry  Capillary Refill: Capillary refill takes less than 2 seconds  Neurological:      Mental Status: He is alert and oriented to person, place, and time  Vital Signs  ED Triage Vitals [01/04/21 1112]   Temperature Pulse Respirations Blood Pressure SpO2   97 9 °F (36 6 °C) 96 18 154/81 100 %      Temp Source Heart Rate Source Patient Position - Orthostatic VS BP Location FiO2 (%)   Oral Monitor Lying Right arm --      Pain Score       --           Vitals:    01/04/21 1112   BP: 154/81   Pulse: 96   Patient Position - Orthostatic VS: Lying         Visual Acuity      ED Medications  Medications   morphine (PF) 4 mg/mL injection 4 mg (has no administration in time range)   morphine (PF) 4 mg/mL injection 4 mg (4 mg Intravenous Given 1/4/21 1318)   LORazepam (ATIVAN) injection 0 5 mg (0 5 mg Intravenous Given 1/4/21 1318)       Diagnostic Studies  Results Reviewed     Procedure Component Value Units Date/Time    Urine Microscopic [[id number]]  (Abnormal) Collected: 01/04/21 1308    Lab Status: Final result Specimen: Urine, Clean Catch Updated: 01/04/21 1324     RBC, UA 1-2 /hpf      WBC, UA 10-20 /hpf      Epithelial Cells None Seen /hpf      Bacteria, UA Occasional /hpf     Urine culture [[id number]] Collected: 01/04/21 1308    Lab Status:  In process Specimen: Urine, Clean Catch Updated: 01/04/21 9180    Basic metabolic panel [[id number]] Collected: 01/04/21 1306    Lab Status: Final result Specimen: Blood from Hand, Left Updated: 01/04/21 1324     Sodium 136 mmol/L      Potassium 4 1 mmol/L      Chloride 103 mmol/L      CO2 26 mmol/L      ANION GAP 7 mmol/L      BUN 16 mg/dL      Creatinine 1 03 mg/dL      Glucose 95 mg/dL      Calcium 8 8 mg/dL      eGFR 108 ml/min/1 73sq m     Narrative:      Dale guidelines for Chronic Kidney Disease (CKD):     Stage 1 with normal or high GFR (GFR > 90 mL/min/1 73 square meters)    Stage 2 Mild CKD (GFR = 60-89 mL/min/1 73 square meters)    Stage 3A Moderate CKD (GFR = 45-59 mL/min/1 73 square meters)    Stage 3B Moderate CKD (GFR = 30-44 mL/min/1 73 square meters)    Stage 4 Severe CKD (GFR = 15-29 mL/min/1 73 square meters)    Stage 5 End Stage CKD (GFR <15 mL/min/1 73 square meters)  Note: GFR calculation is accurate only with a steady state creatinine    CBC and differential [[id number]]  (Abnormal) Collected: 01/04/21 1306    Lab Status: Final result Specimen: Blood from Hand, Left Updated: 01/04/21 1317     WBC 7 97 Thousand/uL      RBC 4 25 Million/uL      Hemoglobin 12 7 g/dL      Hematocrit 39 7 %      MCV 93 fL      MCH 29 9 pg      MCHC 32 0 g/dL      RDW 12 1 %      MPV 10 3 fL      Platelets 138 Thousands/uL      nRBC 0 /100 WBCs      Neutrophils Relative 40 %      Immat GRANS % 0 %      Lymphocytes Relative 47 %      Monocytes Relative 9 %      Eosinophils Relative 4 %      Basophils Relative 0 %      Neutrophils Absolute 3 22 Thousands/µL      Immature Grans Absolute 0 02 Thousand/uL      Lymphocytes Absolute 3 67 Thousands/µL      Monocytes Absolute 0 74 Thousand/µL      Eosinophils Absolute 0 31 Thousand/µL      Basophils Absolute 0 01 Thousands/µL     UA w Reflex to Microscopic w Reflex to Culture [[id number]]  (Abnormal) Collected: 01/04/21 1308    Lab Status: Final result Specimen: Urine, Clean Catch Updated: 01/04/21 1314     Color, UA Yellow     Clarity, UA Slightly Cloudy     Specific Gravity, UA >=1 030     pH, UA 6 0     Leukocytes, UA Small     Nitrite, UA Negative     Protein, UA 30 (1+) mg/dl      Glucose, UA Negative mg/dl      Ketones, UA Negative mg/dl      Urobilinogen, UA 0 2 E U /dl      Bilirubin, UA Negative     Blood, UA Small                 CT renal stone study abdomen pelvis wo contrast   Final Result by Fan Love MD (01/04 4367)      1  Right nephroureteral stent in place  No definite calculi seen along the course of the stent or within the right kidney  2   Unremarkable left kidney  Workstation performed: [id number]                    Procedures  Procedures         ED Course  ED Course as of Jan 04 2112   Polly Marie Jan 04, 2021   159 27-year-old male presenting to the emergency department with rectal pain/prolapse, hematuria, right flank pain, urinary retention status post ureteral stent placement  1250 Vital signs upon arrival within normal limits  Blood Pressure: 154/81   1250 Rectal exam deferred momentarily while waiting for pain and anxiety medication  Otherwise exam grossly unremarkable  1250 Differential diagnosis includes rectal prolapse, constipation, urinary retention, obstructive stone, cystitis, pyelonephritis  Lab work and imaging ordered  1328 WBC, UA(!): 10-20   1328 Leukocytes, UA(!): Small   1328 POCT URINE PROTEIN(!): 30 (1+)   1328 Blood, UA(!): Small   1400 Lab work including CBC, BMP grossly unremarkable  UA with white blood cell findings  1400 Upon reassessment, patient's pain has slightly improved, however has significant rectal pain  Rectal exam was performed  See media tab for images  No apparent prolapse  A large hemorrhoid noted  Very tender to palpation  1426 1  Right nephroureteral stent in place  No definite calculi seen along the course of the stent or within the right kidney      2  Unremarkable left kidney        CT renal stone study abdomen pelvis wo contrast   1455 Patient received additional 4 mg morphine for pain  Colorectal surgery was contacted regarding large painful hemorrhoid  Urology was notified of patient in emergency department  1517 Surgery to [facility] hemorrhoid at bedside  Urology recommends boluses, Toradol, peridium, tamsulosin, bowel regimen, Ditropan for stent colic  1904 Patient received 2 normal saline boluses in addition to the medications as recommended by Urology  Will reassess at this time           after reassessment, patient only continues to have pain and states that he does not feel safe to be discharged as pharmacies are closed at this hour  Urology was notified  General Medicine accepted for observation  MDM    Disposition  Final diagnoses:   None     ED Disposition     None      Follow-up Information    None         Patient's Medications   Discharge Prescriptions    No medications on file     No discharge procedures on file      PDMP Review     None          ED Provider  Electronically Signed by           Natalie Lozada MD  01/04/21 5290

## 2021-01-05 LAB
ANION GAP SERPL CALCULATED.3IONS-SCNC: 5 MMOL/L (ref 4–13)
BACTERIA UR CULT: NORMAL
BUN SERPL-MCNC: 13 MG/DL (ref 5–25)
CALCIUM SERPL-MCNC: 8.3 MG/DL (ref 8.3–10.1)
CHLORIDE SERPL-SCNC: 106 MMOL/L (ref 100–108)
CO2 SERPL-SCNC: 25 MMOL/L (ref 21–32)
CREAT SERPL-MCNC: 1.05 MG/DL (ref 0.6–1.3)
ERYTHROCYTE [DISTWIDTH] IN BLOOD BY AUTOMATED COUNT: 12 % (ref 11.6–15.1)
GFR SERPL CREATININE-BSD FRML MDRD: 105 ML/MIN/1.73SQ M
GLUCOSE P FAST SERPL-MCNC: 86 MG/DL (ref 65–99)
GLUCOSE SERPL-MCNC: 86 MG/DL (ref 65–140)
HCT VFR BLD AUTO: 38.3 % (ref 36.5–49.3)
HGB BLD-MCNC: 12.3 G/DL (ref 12–17)
MCH RBC QN AUTO: 30 PG (ref 26.8–34.3)
MCHC RBC AUTO-ENTMCNC: 32.1 G/DL (ref 31.4–37.4)
MCV RBC AUTO: 93 FL (ref 82–98)
PLATELET # BLD AUTO: 253 THOUSANDS/UL (ref 149–390)
PMV BLD AUTO: 10.5 FL (ref 8.9–12.7)
POTASSIUM SERPL-SCNC: 4 MMOL/L (ref 3.5–5.3)
RBC # BLD AUTO: 4.1 MILLION/UL (ref 3.88–5.62)
SODIUM SERPL-SCNC: 136 MMOL/L (ref 136–145)
WBC # BLD AUTO: 8.28 THOUSAND/UL (ref 4.31–10.16)

## 2021-01-05 PROCEDURE — 36415 COLL VENOUS BLD VENIPUNCTURE: CPT | Performed by: PHYSICIAN ASSISTANT

## 2021-01-05 PROCEDURE — 99225 PR SBSQ OBSERVATION CARE/DAY 25 MINUTES: CPT | Performed by: FAMILY MEDICINE

## 2021-01-05 PROCEDURE — 99245 OFF/OP CONSLTJ NEW/EST HI 55: CPT | Performed by: NURSE PRACTITIONER

## 2021-01-05 PROCEDURE — 80048 BASIC METABOLIC PNL TOTAL CA: CPT | Performed by: PHYSICIAN ASSISTANT

## 2021-01-05 PROCEDURE — 85027 COMPLETE CBC AUTOMATED: CPT | Performed by: PHYSICIAN ASSISTANT

## 2021-01-05 RX ORDER — PHENAZOPYRIDINE HYDROCHLORIDE 100 MG/1
200 TABLET, FILM COATED ORAL
Status: DISCONTINUED | OUTPATIENT
Start: 2021-01-06 | End: 2021-01-07 | Stop reason: HOSPADM

## 2021-01-05 RX ORDER — PHENAZOPYRIDINE HYDROCHLORIDE 100 MG/1
100 TABLET, FILM COATED ORAL
Status: DISCONTINUED | OUTPATIENT
Start: 2021-01-05 | End: 2021-01-05

## 2021-01-05 RX ADMIN — MORPHINE SULFATE 2 MG: 2 INJECTION, SOLUTION INTRAMUSCULAR; INTRAVENOUS at 11:31

## 2021-01-05 RX ADMIN — PHENAZOPYRIDINE 100 MG: 100 TABLET ORAL at 11:32

## 2021-01-05 RX ADMIN — MORPHINE SULFATE 4 MG: 4 INJECTION INTRAVENOUS at 04:20

## 2021-01-05 RX ADMIN — KETOROLAC TROMETHAMINE 30 MG: 30 INJECTION, SOLUTION INTRAMUSCULAR at 01:04

## 2021-01-05 RX ADMIN — MORPHINE SULFATE 4 MG: 4 INJECTION INTRAVENOUS at 22:52

## 2021-01-05 RX ADMIN — Medication 1 PATCH: at 10:10

## 2021-01-05 RX ADMIN — KETOROLAC TROMETHAMINE 30 MG: 30 INJECTION, SOLUTION INTRAMUSCULAR at 20:16

## 2021-01-05 RX ADMIN — OXYBUTYNIN CHLORIDE 5 MG: 5 TABLET ORAL at 10:09

## 2021-01-05 RX ADMIN — KETOROLAC TROMETHAMINE 30 MG: 30 INJECTION, SOLUTION INTRAMUSCULAR at 15:12

## 2021-01-05 RX ADMIN — PHENAZOPYRIDINE 100 MG: 100 TABLET ORAL at 15:12

## 2021-01-05 RX ADMIN — OXYBUTYNIN CHLORIDE 5 MG: 5 TABLET ORAL at 15:26

## 2021-01-05 RX ADMIN — MORPHINE SULFATE 4 MG: 4 INJECTION INTRAVENOUS at 15:26

## 2021-01-05 RX ADMIN — TAMSULOSIN HYDROCHLORIDE 0.4 MG: 0.4 CAPSULE ORAL at 15:43

## 2021-01-05 RX ADMIN — KETOROLAC TROMETHAMINE 30 MG: 30 INJECTION, SOLUTION INTRAMUSCULAR at 10:11

## 2021-01-05 RX ADMIN — SODIUM CHLORIDE 125 ML/HR: 0.9 INJECTION, SOLUTION INTRAVENOUS at 13:49

## 2021-01-05 RX ADMIN — OXYBUTYNIN CHLORIDE 5 MG: 5 TABLET ORAL at 20:16

## 2021-01-05 NOTE — PLAN OF CARE
Problem: Potential for Falls  Goal: Patient will remain free of falls  Description: INTERVENTIONS:  - Assess patient frequently for physical needs  -  Identify cognitive and physical deficits and behaviors that affect risk of falls    -  [place] fall precautions as indicated by assessment   - Educate patient/family on patient safety including physical limitations  - Instruct patient to call for assistance with activity based on assessment  - Modify environment to reduce risk of injury  - Consider OT/PT consult to assist with strengthening/mobility  Outcome: Progressing     Problem: PAIN - ADULT  Goal: Verbalizes/displays adequate comfort level or baseline comfort level  Description: Interventions:  - Encourage patient to monitor pain and request assistance  - Assess pain using appropriate pain scale  - Administer analgesics based on type and severity of pain and evaluate response  - Implement non-pharmacological measures as appropriate and evaluate response  - Consider cultural and social influences on pain and pain management  - Notify physician/advanced practitioner if interventions unsuccessful or patient reports new pain  Outcome: Progressing     Problem: INFECTION - ADULT  Goal: Absence or prevention of progression during hospitalization  Description: INTERVENTIONS:  - Assess and monitor for signs and symptoms of infection  - Monitor lab/diagnostic results  - Monitor all insertion sites, i e  indwelling lines, tubes, and drains  - Monitor endotracheal if appropriate and nasal secretions for changes in amount and color  - [place] appropriate cooling/warming therapies per order  - Administer medications as ordered  - Instruct and encourage patient and family to use good hand hygiene technique  - Identify and instruct in appropriate isolation precautions for identified infection/condition  Outcome: Progressing  Goal: Absence of fever/infection during neutropenic period  Description: INTERVENTIONS:  - Monitor WBC    Outcome: Progressing     Problem: SAFETY ADULT  Goal: Patient will remain free of falls  Description: INTERVENTIONS:  - Assess patient frequently for physical needs  -  Identify cognitive and physical deficits and behaviors that affect risk of falls    -  [place] fall precautions as indicated by assessment   - Educate patient/family on patient safety including physical limitations  - Instruct patient to call for assistance with activity based on assessment  - Modify environment to reduce risk of injury  - Consider OT/PT consult to assist with strengthening/mobility  Outcome: Progressing  Goal: Maintain or return to baseline ADL function  Description: INTERVENTIONS:  -  Assess patient's ability to carry out ADLs; assess patient's baseline for ADL function and identify physical deficits which impact ability to perform ADLs (bathing, care of mouth/teeth, toileting, grooming, dressing, etc )  - Assess/evaluate cause of self-care deficits   - Assess range of motion  - Assess patient's mobility; develop plan if impaired  - Assess patient's need for assistive devices and provide as appropriate  - Encourage maximum independence but intervene and supervise when necessary  - Involve family in performance of ADLs  - Assess for home care needs following discharge   - Consider OT consult to assist with ADL evaluation and planning for discharge  - Provide patient education as appropriate  Outcome: Progressing  Goal: Maintain or return mobility status to optimal level  Description: INTERVENTIONS:  - Assess patient's baseline mobility status (ambulation, transfers, stairs, etc )    - Identify cognitive and physical deficits and behaviors that affect mobility  - Identify mobility aids required to assist with transfers and/or ambulation (gait belt, sit-to-stand, lift, walker, cane, etc )  - [place] fall precautions as indicated by assessment  - Record patient progress and toleration of activity level on Mobility SBAR; progress patient to next Phase/Stage  - Instruct patient to call for assistance with activity based on assessment  - Consider rehabilitation consult to assist with strengthening/weightbearing, etc   Outcome: Progressing     Problem: DISCHARGE PLANNING  Goal: Discharge to home or other facility with appropriate resources  Description: INTERVENTIONS:  - Identify barriers to discharge w/patient and caregiver  - Arrange for needed discharge resources and transportation as appropriate  - Identify discharge learning needs (meds, wound care, etc )  - Arrange for interpretive services to assist at discharge as needed  - Refer to Case Management Department for coordinating discharge planning if the patient needs post-hospital services based on physician/advanced practitioner order or complex needs related to functional status, cognitive ability, or social support system  Outcome: Progressing     Problem: Knowledge Deficit  Goal: Patient/family/caregiver demonstrates understanding of disease process, treatment plan, medications, and discharge instructions  Description: Complete learning assessment and assess knowledge base    Interventions:  - Provide teaching at level of understanding  - Provide teaching via preferred learning methods  Outcome: Progressing

## 2021-01-05 NOTE — ASSESSMENT & PLAN NOTE
· 2/2 straining w/ urination  · S/p agata in the ED by CRS  · Pain control, sitz baths    Follow up with Colorectal surgery

## 2021-01-05 NOTE — DISCHARGE INSTRUCTIONS
Please make sure to follow-up with urology and take the medication regimen that they recommend  The Pyridium will make your urine read as we previously discussed  This is normal   Please also make sure to follow-up with surgery in regards to your hemorrhoidectomy  Do not take narcotics with other relaxants like alcohol or benzodiazepines/barbiturates  If you develops any worsening symptoms including fever, worsening pain, inability to urinate, or any other concerning symptoms, please return to the emergency department as it could be signs of worsening illness

## 2021-01-05 NOTE — CONSULTS
UROLOGY CONSULTATION NOTE     Patient Identifiers: Camryn Spann (MRN [id number])  Service Requesting Consultation: Esther Juarez MD    Service Providing Consultation:  Urology, Aram Hernandez    Date of Service: 1/5/2021  Inpatient consult to Urology  Consult performed by: SANA Hernandez  Consult ordered by: Marcie Mckeon PA-C          Reason for Consultation:  Flank pain    ASSESSMENT:     38 y o  old male with  recent right obstructing ureteral calculus, status post cystoscopy, retrograde pyelography, right ureteroscopy, right laser lithotripsy and right ureteral stent placement, recent UTI and severe stent colic  PLAN:   Agree with admission for aggressive pain control  Treatment for stent colic should consist of the following:  · Aggressive IV fluids of at least 125 mL/hr  · Flomax, Ditropan, Pyridium and belladonna and opium suppositories  · Bowel regimen  Rationale-- Constipation exacerbates stent colic  · Anti-inflammatories  · Local heat application to flank via  Aqua K-pad  · IV narcotics until patient can be gradually weaned to oral narcotics as needed  No indication for immediate  surgical intervention at this time  Will review with attending role for cystoscopy, possible retrograde pyelography and/or stent retrieval in patient with exquisite extraordinary stent related pain, if no improvement with aggressive inpatient treatment  History of Present Illness:     Camryn Spann is a 38 y o  old male status post ureteroscopy for right obstructing ureteral calculus at [facility] by Dr Bryanna Fish 12/2  Per operative report, technically difficult procedure secondary to incidental finding of ureteral stricture  Patient was to have stent in place for approximately 10 days  He contacted office to obtain hospital follow-up and to arrange for cystoscopic stent retrieval as instructed  Unfortunately, patient had several issues arranging for appointment    He reports that he was not on books  Furthermore, patient goes on to state that a sum of money was requested as well likely due to lack of insurance  Patient states he is "unable to pay $6000 dollars"  In the interim, patient was seen in the emergency room on both 12/15 and 12/28 for treatment of pyelonephritis and multiple episodes of persistent stent colic  He was treated with Bactrim by emergency room provider and completed course  Out of dissatisfaction, patient contacted our office for further management and was evaluated 12/31  Incidentally, repeat CT scan from ER visit 12/15 demonstrated residual stone fragments  It was recommended patient would proceed with 2nd ureteroscopy prior to stent removal to treat remaining stones  Patient was tentatively scheduled for 1/11  He re-presented with 4th hospital visit within the past month for persistent right flank pain; not accompanied by fever chills, but significant radiating pain, dysuria, gross hematuria, sensation of incomplete bladder emptying with stranguria and rectal pain due to over 4 weeks of straining  He was evaluated in the emergency room by Colorectal surgery which discovered thrombosed hemorrhoid  This was treated by the Colorectal team   Refer to procedural report for details  CT of the abdomen and pelvis demonstrated favorable position of right ureteral stent without evidence of hydronephrosis  There was no evidence of significant residual stone compared to prior imaging  Patient's temperature is 99 6°  He is otherwise hemodynamically stable  He has a normal creatinine and white count  Recent urinalysis positive for 10-20 wbc's per high-powered field  However, urine culture did not demonstrate bacterial growth  Patient is voiding volitionally, although with a significant amount of anxiety prevoid secondary to anticipatory pain  He describes pain as voiding glass with extreme flank and right lower quadrant pain upon micturition  Bedside urinal contains grossly clear yellow urine and improvement from reported hematuria and dark discolored urine in the recent past   He states urine is clearing with IV fluids  Our service was contacted secondary to significant recent  history and primary urologic complaints for further management recommendations  Past Medical, Past Surgical History:     Past Medical History:   Diagnosis Date    Asthma    :    Past Surgical History:   Procedure Laterality Date    JOINT REPLACEMENT Right     knee    KNEE SURGERY      [place] CYSTO/URETERO W/LITHOTRIPSY &INDWELL STENT INSRT Right 12/2/2020    Procedure: CYSTOSCOPY URETEROSCOPY WITH LITHOTRIPSY HOLMIUM LASER, RETROGRADE PYELOGRAM, STONE BASKET RETRIEVAL, URETHRAL DILATION  AND INSERTION STENT URETERAL;  Surgeon: Rahel Esparza MD;  Location: [address] [place] [address];  Service: Urology    SHOULDER SURGERY Right    :    Medications, Allergies:     Current Facility-Administered Medications   Medication Dose Route Frequency    acetaminophen (TYLENOL) tablet 650 mg  650 mg Oral Q6H PRN    albuterol (PROVENTIL HFA,VENTOLIN HFA) inhaler 2 puff  2 puff Inhalation Q4H PRN    belladonna-opium (B&O SUPPOSITORY) 16 2-30 mg suppository 1 suppository  30 mg Rectal Q8H PRN    ketorolac (TORADOL) injection 30 mg  30 mg Intravenous Q6H [facility]    morphine (PF) 4 mg/mL injection 4 mg  4 mg Intravenous Q4H PRN    nicotine (NICODERM CQ) 14 mg/24hr TD 24 hr patch 1 patch  1 patch Transdermal Daily    ondansetron (ZOFRAN) injection 4 mg  4 mg Intravenous Q6H PRN    oxybutynin (DITROPAN) tablet 5 mg  5 mg Oral 4x Daily    phenazopyridine (PYRIDIUM) tablet 100 mg  100 mg Oral TID With Meals    sodium chloride 0 9 % infusion  125 mL/hr Intravenous Continuous    tamsulosin (FLOMAX) capsule 0 4 mg  0 4 mg Oral Daily With Dinner       Allergies:   Allergies   Allergen Reactions    Latex     Levofloxacin     Penicillins     Rocephin [Ceftriaxone]     Shellfish-Derived Products    [facility] Coconut Oil Rash   :    Social and Family History:   Social History:   Social History     Tobacco Use    Smoking status: Current Every Day Smoker     Packs/day: 0 50     Types: Cigarettes    Smokeless tobacco: Never Used   Substance Use Topics    Alcohol use: Not Currently    Drug use: Never     Social History     Tobacco Use   Smoking Status Current Every Day Smoker    Packs/day: 0 50    Types: Cigarettes   Smokeless Tobacco Never Used       Family History:  History reviewed  No pertinent family history :     Review of Systems:   Review of Systems - History obtained from chart review and the patient  General ROS: positive for  - chills and fever  Respiratory ROS: no cough, shortness of breath, or wheezing  Cardiovascular ROS: no chest pain or dyspnea on exertion  Gastrointestinal ROS: positive for - abdominal pain, appetite loss and nausea/vomiting  Genito-Urinary ROS: positive for - change in urinary stream, dysuria, hematuria, incontinence, pelvic pain and urinary frequency/urgency  negative for - scrotal mass/pain  Neurological ROS: no TIA or stroke symptoms    All other systems queried were negative  Physical Exam:   General: Patient is pleasant and in NAD  Awake and alert  /65 (BP Location: Right arm)   Pulse 92   Temp 98 7 °F (37 1 °C) (Oral)   Resp 16   Wt 99 8 kg (220 lb)   SpO2 95%   BMI 34 46 kg/m² Temp (24hrs), Av 7 °F (37 1 °C), Min:98 7 °F (37 1 °C), Max:98 7 °F (37 1 °C)  current; Temperature: 98 7 °F (37 1 °C)  I/O last 24 hours:   In:  [IV Piggyback:]  Out: 1000 [Urine:1000]    General appearance: alert and oriented, in no acute distress, appears stated age, cooperative and moderate distress  Head: Normocephalic, without obvious abnormality, atraumatic  Neck: no adenopathy, no carotid bruit, no JVD, supple, symmetrical, trachea midline and thyroid not enlarged, symmetric, no tenderness/mass/nodules  Lungs: clear to auscultation bilaterally  Heart: regular rate and rhythm, S1, S2 normal, no murmur, click, rub or gallop  Abdomen: abnormal findings:  moderate tenderness in the lower abdomen and in the right flank  Extremities: extremities normal, warm and well-perfused; no cyanosis, clubbing, or edema  Pulses: 2+ and symmetric  Neurologic: Grossly normal  Bedside urinal contains grossly clear yellow urine    Labs:     Lab Results   Component Value Date    HGB 12 3 01/05/2021    HCT 38 3 01/05/2021    WBC 8 28 01/05/2021     01/05/2021   ]    Lab Results   Component Value Date    K 4 0 01/05/2021     01/05/2021    CO2 25 01/05/2021    BUN 13 01/05/2021    CREATININE 1 05 01/05/2021    CALCIUM 8 3 01/05/2021   ]    Imaging:   I personally reviewed the images and report of the following studies, and reviewed them with the patient:    CT Abdomen: See below    CT renal stone study abdomen pelvis wo contrast [[id number]] Collected: 01/04/21 1356   Order Status: Completed Updated: 01/04/21 1407   Narrative:     CT ABDOMEN AND PELVIS WITHOUT IV CONTRAST - LOW DOSE RENAL STONE     INDICATION:   Flank pain, kidney stone suspected   Hematuria, renal cause suspected   urinary retention, hemature, severe pain  COMPARISON:  12/15/2024     TECHNIQUE:  Low dose thin section CT examination of the abdomen and pelvis was performed without intravenous or oral contrast according to a protocol specifically designed to evaluate for urinary tract calculus   Axial, sagittal, and coronal 2D   reformatted images were created from the source data and submitted for interpretation    Evaluation for pathology in the abdomen and pelvis that is unrelated to urinary tract calculi is limited  Radiation dose length product (DLP) for this visit: 462-159-8000 mGy-cm    This examination, like all CT scans performed in the [facility], was performed utilizing techniques to minimize radiation dose exposure, including the use of iterative   reconstruction and automated exposure control  FINDINGS:     RIGHT KIDNEY AND URETER:   Nephroureteral stent remains in place   No definite calculi seen along the course of the stent   No intrarenal calculi are identified  LEFT KIDNEY AND URETER:   No urinary tract calculi   No hydronephrosis or hydroureter  URINARY BLADDER:   Unremarkable  No significant abnormality in the visualized lung bases  Limited low radiation dose noncontrast CT evaluation demonstrates no clinically significant abnormality of liver, spleen, pancreas, or adrenal glands  No calcified gallstones or gallbladder wall thickening noted  No ascites or bulky lymphadenopathy on this limited noncontrast study  Bowel loops appear unremarkable  The appendix is well seen and there is no evidence of acute appendicitis  No acute fracture or destructive osseous lesion is identified  Impression:       1   Right nephroureteral stent in place   No definite calculi seen along the course of the stent or within the right kidney  2   Unremarkable left kidney  Workstation performed: [id number]            Thank you for allowing me to participate in this patients care  Please do not hesitate to call with any additional questions    SANA Cardozo

## 2021-01-05 NOTE — H&P (VIEW-ONLY)
UROLOGY CONSULTATION NOTE     Patient Identifiers: Jovanny Gimenez (MRN [id number])  Service Requesting Consultation: Tamara Granados MD    Service Providing Consultation:  Urology, Memo Stephen, Aram Short    Date of Service: 1/5/2021  Inpatient consult to Urology  Consult performed by: SANA Garcia  Consult ordered by: Virgie Cordova PA-C          Reason for Consultation:  Flank pain    ASSESSMENT:     38 y o  old male with  recent right obstructing ureteral calculus, status post cystoscopy, retrograde pyelography, right ureteroscopy, right laser lithotripsy and right ureteral stent placement, recent UTI and severe stent colic  PLAN:   Agree with admission for aggressive pain control  Treatment for stent colic should consist of the following:  · Aggressive IV fluids of at least 125 mL/hr  · Flomax, Ditropan, Pyridium and belladonna and opium suppositories  · Bowel regimen  Rationale-- Constipation exacerbates stent colic  · Anti-inflammatories  · Local heat application to flank via  Aqua K-pad  · IV narcotics until patient can be gradually weaned to oral narcotics as needed  No indication for immediate  surgical intervention at this time  Will review with attending role for cystoscopy, possible retrograde pyelography and/or stent retrieval in patient with exquisite extraordinary stent related pain, if no improvement with aggressive inpatient treatment  History of Present Illness:     Jovanny Gimenez is a 38 y o  old male status post ureteroscopy for right obstructing ureteral calculus at [facility] by Dr Manish Henderson 12/2  Per operative report, technically difficult procedure secondary to incidental finding of ureteral stricture  Patient was to have stent in place for approximately 10 days  He contacted office to obtain hospital follow-up and to arrange for cystoscopic stent retrieval as instructed  Unfortunately, patient had several issues arranging for appointment    He reports that he was not on books  Furthermore, patient goes on to state that a sum of money was requested as well likely due to lack of insurance  Patient states he is "unable to pay $6000 dollars"  In the interim, patient was seen in the emergency room on both 12/15 and 12/28 for treatment of pyelonephritis and multiple episodes of persistent stent colic  He was treated with Bactrim by emergency room provider and completed course  Out of dissatisfaction, patient contacted our office for further management and was evaluated 12/31  Incidentally, repeat CT scan from ER visit 12/15 demonstrated residual stone fragments  It was recommended patient would proceed with 2nd ureteroscopy prior to stent removal to treat remaining stones  Patient was tentatively scheduled for 1/11  He re-presented with 4th hospital visit within the past month for persistent right flank pain; not accompanied by fever chills, but significant radiating pain, dysuria, gross hematuria, sensation of incomplete bladder emptying with stranguria and rectal pain due to over 4 weeks of straining  He was evaluated in the emergency room by Colorectal surgery which discovered thrombosed hemorrhoid  This was treated by the Colorectal team   Refer to procedural report for details  CT of the abdomen and pelvis demonstrated favorable position of right ureteral stent without evidence of hydronephrosis  There was no evidence of significant residual stone compared to prior imaging  Patient's temperature is 99 6°  He is otherwise hemodynamically stable  He has a normal creatinine and white count  Recent urinalysis positive for 10-20 wbc's per high-powered field  However, urine culture did not demonstrate bacterial growth  Patient is voiding volitionally, although with a significant amount of anxiety prevoid secondary to anticipatory pain  He describes pain as voiding glass with extreme flank and right lower quadrant pain upon micturition  Bedside urinal contains grossly clear yellow urine and improvement from reported hematuria and dark discolored urine in the recent past   He states urine is clearing with IV fluids  Our service was contacted secondary to significant recent  history and primary urologic complaints for further management recommendations  Past Medical, Past Surgical History:     Past Medical History:   Diagnosis Date    Asthma    :    Past Surgical History:   Procedure Laterality Date    JOINT REPLACEMENT Right     knee    KNEE SURGERY      [place] CYSTO/URETERO W/LITHOTRIPSY &INDWELL STENT INSRT Right 12/2/2020    Procedure: CYSTOSCOPY URETEROSCOPY WITH LITHOTRIPSY HOLMIUM LASER, RETROGRADE PYELOGRAM, STONE BASKET RETRIEVAL, URETHRAL DILATION  AND INSERTION STENT URETERAL;  Surgeon: Dilia Wong MD;  Location: [address] [place] [address];  Service: Urology    SHOULDER SURGERY Right    :    Medications, Allergies:     Current Facility-Administered Medications   Medication Dose Route Frequency    acetaminophen (TYLENOL) tablet 650 mg  650 mg Oral Q6H PRN    albuterol (PROVENTIL HFA,VENTOLIN HFA) inhaler 2 puff  2 puff Inhalation Q4H PRN    belladonna-opium (B&O SUPPOSITORY) 16 2-30 mg suppository 1 suppository  30 mg Rectal Q8H PRN    ketorolac (TORADOL) injection 30 mg  30 mg Intravenous Q6H [address]    morphine (PF) 4 mg/mL injection 4 mg  4 mg Intravenous Q4H PRN    nicotine (NICODERM CQ) 14 mg/24hr TD 24 hr patch 1 patch  1 patch Transdermal Daily    ondansetron (ZOFRAN) injection 4 mg  4 mg Intravenous Q6H PRN    oxybutynin (DITROPAN) tablet 5 mg  5 mg Oral 4x Daily    phenazopyridine (PYRIDIUM) tablet 100 mg  100 mg Oral TID With Meals    sodium chloride 0 9 % infusion  125 mL/hr Intravenous Continuous    tamsulosin (FLOMAX) capsule 0 4 mg  0 4 mg Oral Daily With Dinner       Allergies:   Allergies   Allergen Reactions    Latex     Levofloxacin     Penicillins     Rocephin [Ceftriaxone]     Shellfish-Derived Products    Michael Judah Coconut Oil Rash   :    Social and Family History:   Social History:   Social History     Tobacco Use    Smoking status: Current Every Day Smoker     Packs/day: 0 50     Types: Cigarettes    Smokeless tobacco: Never Used   Substance Use Topics    Alcohol use: Not Currently    Drug use: Never     Social History     Tobacco Use   Smoking Status Current Every Day Smoker    Packs/day: 0 50    Types: Cigarettes   Smokeless Tobacco Never Used       Family History:  History reviewed  No pertinent family history :     Review of Systems:   Review of Systems - History obtained from chart review and the patient  General ROS: positive for  - chills and fever  Respiratory ROS: no cough, shortness of breath, or wheezing  Cardiovascular ROS: no chest pain or dyspnea on exertion  Gastrointestinal ROS: positive for - abdominal pain, appetite loss and nausea/vomiting  Genito-Urinary ROS: positive for - change in urinary stream, dysuria, hematuria, incontinence, pelvic pain and urinary frequency/urgency  negative for - scrotal mass/pain  Neurological ROS: no TIA or stroke symptoms    All other systems queried were negative  Physical Exam:   General: Patient is pleasant and in NAD  Awake and alert  /65 (BP Location: Right arm)   Pulse 92   Temp 98 7 °F (37 1 °C) (Oral)   Resp 16   Wt 99 8 kg (220 lb)   SpO2 95%   BMI 34 46 kg/m² Temp (24hrs), Av 7 °F (37 1 °C), Min:98 7 °F (37 1 °C), Max:98 7 °F (37 1 °C)  current; Temperature: 98 7 °F (37 1 °C)  I/O last 24 hours:   In:  [IV Piggyback:]  Out: 1000 [Urine:1000]    General appearance: alert and oriented, in no acute distress, appears stated age, cooperative and moderate distress  Head: Normocephalic, without obvious abnormality, atraumatic  Neck: no adenopathy, no carotid bruit, no JVD, supple, symmetrical, trachea midline and thyroid not enlarged, symmetric, no tenderness/mass/nodules  Lungs: clear to auscultation bilaterally  Heart: regular rate and rhythm, S1, S2 normal, no murmur, click, rub or gallop  Abdomen: abnormal findings:  moderate tenderness in the lower abdomen and in the right flank  Extremities: extremities normal, warm and well-perfused; no cyanosis, clubbing, or edema  Pulses: 2+ and symmetric  Neurologic: Grossly normal  Bedside urinal contains grossly clear yellow urine    Labs:     Lab Results   Component Value Date    HGB 12 3 01/05/2021    HCT 38 3 01/05/2021    WBC 8 28 01/05/2021     01/05/2021   ]    Lab Results   Component Value Date    K 4 0 01/05/2021     01/05/2021    CO2 25 01/05/2021    BUN 13 01/05/2021    CREATININE 1 05 01/05/2021    CALCIUM 8 3 01/05/2021   ]    Imaging:   I personally reviewed the images and report of the following studies, and reviewed them with the patient:    CT Abdomen: See below    CT renal stone study abdomen pelvis wo contrast [[id number]] Collected: 01/04/21 1356   Order Status: Completed Updated: 01/04/21 1407   Narrative:     CT ABDOMEN AND PELVIS WITHOUT IV CONTRAST - LOW DOSE RENAL STONE     INDICATION:   Flank pain, kidney stone suspected   Hematuria, renal cause suspected   urinary retention, hemature, severe pain  COMPARISON:  12/15/2024     TECHNIQUE:  Low dose thin section CT examination of the abdomen and pelvis was performed without intravenous or oral contrast according to a protocol specifically designed to evaluate for urinary tract calculus   Axial, sagittal, and coronal 2D   reformatted images were created from the source data and submitted for interpretation    Evaluation for pathology in the abdomen and pelvis that is unrelated to urinary tract calculi is limited  Radiation dose length product (DLP) for this visit: [phone] mGy-cm    This examination, like all CT scans performed in the [facility], was performed utilizing techniques to minimize radiation dose exposure, including the use of iterative   reconstruction and automated exposure control  FINDINGS:     RIGHT KIDNEY AND URETER:   Nephroureteral stent remains in place   No definite calculi seen along the course of the stent   No intrarenal calculi are identified  LEFT KIDNEY AND URETER:   No urinary tract calculi   No hydronephrosis or hydroureter  URINARY BLADDER:   Unremarkable  No significant abnormality in the visualized lung bases  Limited low radiation dose noncontrast CT evaluation demonstrates no clinically significant abnormality of liver, spleen, pancreas, or adrenal glands  No calcified gallstones or gallbladder wall thickening noted  No ascites or bulky lymphadenopathy on this limited noncontrast study  Bowel loops appear unremarkable  The appendix is well seen and there is no evidence of acute appendicitis  No acute fracture or destructive osseous lesion is identified  Impression:       1   Right nephroureteral stent in place   No definite calculi seen along the course of the stent or within the right kidney  2   Unremarkable left kidney  Workstation performed: [id number]            Thank you for allowing me to participate in this patients care  Please do not hesitate to call with any additional questions    SANA Austin

## 2021-01-05 NOTE — DISCHARGE SUMMARY
Discharge- Reginald Campbell 1985, 38 y o  male MRN: [id number]    Unit/Bed#: ED 26 Encounter: [id number]    Primary Care Provider: No primary care provider on file  Date and time admitted to hospital: 1/4/2021 12:15 PM        Thrombosed external hemorrhoid  Assessment & Plan  · 2/2 straining w/ urination  · S/p agata in the ED by CRS  · Pain control, sitz baths  Follow up with Colorectal surgery    * Colicky RLQ abdominal pain 2/2 ureteral stent  Assessment & Plan  · Patient w/ persistent pain/hematuria since placement of stent in early December  Patient has also been straining w/ urination to a point where he developed a thrombosed hemorrhoid   · CT A/P shows R sided stent and no further evidence of ureteral stone-- likely this has passed; no noted hydronephrosis  ·   I do not think there is anything surgical plan at this time  Urology to evaluate the patient this afternoon and hopeful discharge later          Discharging Physician / Practitioner: Tamara Granados MD  PCP: No primary care provider on file  Admission Date:   Admission Orders (From admission, onward)     Ordered        01/04/21 2037  Place in Observation  Once                   Discharge Date: 01/05/21    Resolved Problems  Date Reviewed: 1/5/2021    None          Consultations During Hospital Stay:  · urology    Procedures Performed:   CT scan:1   Right nephroureteral stent in place  No definite calculi seen along the course of the stent or within the right kidney      · 2  Unremarkable left kidney  Significant Findings / Test Results:   · None    Incidental Findings:   · None     Test Results Pending at Discharge (will require follow up): · None     Outpatient Tests Requested:  · Outpatient neurology follow-up    Complications:  None    Reason for Admission:  Flank pain    Hospital Course:     Reginald Campbell is a 38 y o  male patient who originally presented to the hospital on 1/4/2021 due to flank pain      History of presenting Nehal Donovan is a 38 y o  male with past medical history significant for right kidney stone status post cystoscopy with lithotripsy and stent placement presents to the ED for evaluation of right lower quadrant abdominal pain  Patient underwent prior procedure in early December  Since then, patient has had persistent pain, hematuria, straining with urination, weak in stream, decreased urine output  Patient has been in and out of the ER and consulting with outpatient urology office to assist with pain management and planning of stent removal   Overnight, patient strained with urinating to the point where he thrombosed hemorrhoid  Given significant pain, patient came to the ED for further evaluation  Multiple pain management modalities were employed in the ED, and patient currently rates his pain 7/10  Hospital course:  Patient was admitted for above reasons  While he was here he was seen by Colorectal surgery the emergency department  They did Oly Kinds his hemorrhoid from that standpoint he is doing okay but the patient states he has been complaining about this pain since the beginning of December in his right flank  He has been dealing with it but it has been getting worse  Patient came in for further evaluation  There was nothing acute on CT scan  I did speak urology will see the patient this afternoon but at this time we do not feel any urgent urological intervention needs to be undertaken at this time  We will await their evaluation  Patient does not require antibiotics as well  Patient states he has been having some decreased urination and states that sometimes he feels like he did not completely empty     Patient has a stent in which may need to be removed  Please see above list of diagnoses and related plan for additional information  Condition at Discharge: fair     Discharge Day Visit / Exam:     Subjective:  Patient seen examined    Complaining of some pain this morning  Vitals: Blood Pressure: 128/79 (01/05/21 1133)  Pulse: 68 (01/05/21 1133)  Temperature: 98 7 °F (37 1 °C) (01/05/21 0753)  Temp Source: Oral (01/05/21 0753)  Respirations: 16 (01/05/21 1133)  Weight - Scale: 99 8 kg (220 lb) (01/04/21 1112)  SpO2: 100 % (01/05/21 1133)  Exam:   Physical Exam  (   General Appearance:    Alert, cooperative, no distress, appears stated age                               Lungs:     Clear to auscultation bilaterally, respirations unlabored       Heart:    Regular rate and rhythm, S1 and S2 normal, no murmur, rub    or gallop   Abdomen:     Right tenderness to palpation  Did not press hard secondary to patient being uncomfortable and Urology coming to evaluate the patient           Extremities:   Extremities normal, atraumatic, no cyanosis or edema       Discharge instructions/Information to patient and family:   See after visit summary for information provided to patient and family  Provisions for Follow-Up Care:  See after visit summary for information related to follow-up care and any pertinent home health orders  Disposition: For Discharges to [facility]:   · Not Applicable to this Patient - Not Applicable to this Patient    Planned Readmission:  Not anticipated     Discharge Statement:  I spent 25 minutes discharging the patient  This time was spent on the day of discharge  I had direct contact with the patient on the day of discharge  Greater than 50% of the total time was spent examining patient, answering all patient questions, arranging and discussing plan of care with patient as well as directly providing post-discharge instructions  Additional time then spent on discharge activities  Discharge Medications:  See after visit summary for reconciled discharge medications provided to patient and family        ** Please Note: This note has been constructed using a voice recognition system **

## 2021-01-05 NOTE — ASSESSMENT & PLAN NOTE
· Patient w/ persistent pain/hematuria since placement of stent in early December   Patient has also been straining w/ urination to a point where he developed a thrombosed hemorrhoid   · CT A/P shows R sided stent and no further evidence of ureteral stone-- likely this has passed; no noted hydronephrosis  · Plan overnight: IVF, pain control, NPO  · Urology evaluation in the AM

## 2021-01-05 NOTE — ED NOTES
Patient "wants medications recalled and given again because it got on the sheets" Made patient aware that was the IV fluids, not medication  Patient now wants to be discharged  Tiger Text sent to JOSEFA Skinner to make aware       Belle Muñiz RN  01/05/21 3819

## 2021-01-05 NOTE — UTILIZATION REVIEW
Initial Clinical Review    Admission: Date/Time/Statement:   Admission Orders (From admission, onward)     Ordered        01/04/21 2037  Place in Observation  Once                   Orders Placed This Encounter   Procedures    Place in Observation     Standing Status:   Standing     Number of Occurrences:   1     Order Specific Question:   Admitting Physician     Answer:   Shanae Baumann [[id number]]     Order Specific Question:   Level of Care     Answer:   Med Surg [[id number]]     ED Arrival Information     Expected Arrival Acuity Means of Arrival Escorted By Service Admission Type    - 1/4/2021 10:58 Urgent Walk-In Spouse General Medicine Urgent    Arrival Complaint    Hemmorhoid, Blood in Urine        Chief Complaint   Patient presents with    Rectal Pain     patient reports having prolapsed rectum last night  + blood in urine after stent placed on 12/2  told by urology to come if still having complications    Blood in Urine     Assessment/Plan: 39 yo male with hx of R kidney stone s/p lithotripsy and stent placement 12/2/2020 presents to ED from home with RLQ abdom pain, hematuria, straining to urinate, decreased UOP  Overnight, patient strained with urinating to the point where he thrombosed hemorrhoid  On exam, pt has RLQ / R groin tenderness, with slight end expiratory wheezing  Pt given IVF, IV ativan, IV analgesic in ED ,po Flomax , pyridium and Ditropan in ED  Pt continue with pain 7/10  CT A/P shows R stent with no signs of stone  External hemorrhoid, large lateral position with inability to tolerate digital exam lanced in ED by Colorectal surgery as consult  F/u in 1 month as outpt with colorectal      Pt admitted as OBS with colicky RLQ abdom pain 2/2 ureteral stent, thrombosed external hemorrhoid 2/2 straining with voiding  Plan is for pain control, sitz baths, continue IVF, NPO, urology consult  1/5  Urology developing plan for continued pain with possible d/c 1/5 or 1/6  Pt continues to have pain r flank, encouraged to ambulate  ED Triage Vitals   Temperature Pulse Respirations Blood Pressure SpO2   01/04/21 1112 01/04/21 1112 01/04/21 1112 01/04/21 1112 01/04/21 1112   97 9 °F (36 6 °C) 96 18 154/81 100 %      Temp Source Heart Rate Source Patient Position - Orthostatic VS BP Location FiO2 (%)   01/04/21 1112 01/04/21 1112 01/04/21 1112 01/04/21 1112 --   Oral Monitor Lying Right arm       Pain Score       01/04/21 1526       Worst Possible Pain          Wt Readings from Last 1 Encounters:   01/04/21 99 8 kg (220 lb)     Additional Vital Signs:   Date/Time  Temp  Pulse  Resp  BP  MAP (mmHg)  SpO2    01/05/21 0753  98 7 °F (37 1 °C)  72  16  114/67    100 %    01/05/21 0507    60  16  112/58    100 %    01/04/21 2145    78  16  118/64  85  95 %    01/04/21 2134    83  16  118/64  85  96 %        Pertinent Labs/Diagnostic Test Results:   1/4 CT renal stone study A/P  1  Right nephroureteral stent in place    No definite calculi seen along the course of the stent or within the right kidney    2   Unremarkable left kidney           Results from last 7 days   Lab Units 01/05/21  0525 01/04/21  1306   WBC Thousand/uL 8 28 7 97   HEMOGLOBIN g/dL 12 3 12 7   HEMATOCRIT % 38 3 39 7   PLATELETS Thousands/uL 253 308   NEUTROS ABS Thousands/µL  --  3 22         Results from last 7 days   Lab Units 01/05/21  0525 01/04/21  1306   SODIUM mmol/L 136 136   POTASSIUM mmol/L 4 0 4 1   CHLORIDE mmol/L 106 103   CO2 mmol/L 25 26   ANION GAP mmol/L 5 7   BUN mg/dL 13 16   CREATININE mg/dL 1 05 1 03   EGFR ml/min/1 73sq m 105 108   CALCIUM mg/dL 8 3 8 8             Results from last 7 days   Lab Units 01/05/21  0525 01/04/21  1306   GLUCOSE RANDOM mg/dL 86 95             Results from last 7 days   Lab Units 01/04/21  1308 12/31/20  1126   CLARITY UA  Slightly Cloudy cloudy   COLOR UA  Yellow yellow   SPEC GRAV UA  >=1 030  --    PH UA  6 0  --    GLUCOSE UA mg/dl Negative neg   KETONES UA mg/dl Negative neg   BLOOD UA  Small* +++   PROTEIN UA mg/dl 30 (1+)* ++   NITRITE UA  Negative neg   BILIRUBIN UA  Negative  --    BILIRUBIN UA POC   --  neg   UROBILINOGEN UA E U /dl 0 2 neg   LEUKOCYTES UA  Small* ++   WBC UA /hpf 10-20*  --    RBC UA /hpf 1-2  --    BACTERIA UA /hpf Occasional  --    EPITHELIAL CELLS WET PREP /hpf None Seen  --          ED Treatment:   Medication Administration from 01/04/2021 1058 to 01/05/2021 0815       Date/Time Order Dose Route Action     01/04/2021 1318 morphine (PF) 4 mg/mL injection 4 mg 4 mg Intravenous Given     01/04/2021 1318 LORazepam (ATIVAN) injection 0 5 mg 0 5 mg Intravenous Given     01/04/2021 1433 morphine (PF) 4 mg/mL injection 4 mg 4 mg Intravenous Given     01/04/2021 1529 lidocaine (PF) (XYLOCAINE-MPF) 1 % injection 10 mL 10 mL Infiltration Given     01/04/2021 1656 ketorolac (TORADOL) injection 15 mg 15 mg Intravenous Given     01/04/2021 1656 phenazopyridine (PYRIDIUM) tablet 100 mg 100 mg Oral Given     01/04/2021 1656 tamsulosin (FLOMAX) capsule 0 4 mg 0 4 mg Oral Given     01/04/2021 1656 docusate sodium (COLACE) capsule 100 mg 100 mg Oral Given     01/04/2021 1526 HYDROmorphone (DILAUDID) injection 1 mg 1 mg Intravenous Given     01/04/2021 1841 oxybutynin (DITROPAN) tablet 5 mg 5 mg Oral Given     01/04/2021 1700 sodium chloride 0 9 % bolus 1,000 mL 1,000 mL Intravenous New Bag     01/04/2021 1700 sodium chloride 0 9 % bolus 1,000 mL 1,000 mL Intravenous New Bag     01/04/2021 2005 morphine (PF) 4 mg/mL injection 4 mg 4 mg Intravenous Given     01/04/2021 2134 oxybutynin (DITROPAN) tablet 5 mg 5 mg Oral Given     01/04/2021 2134 sodium chloride 0 9 % infusion 125 mL/hr Intravenous New Bag     01/05/2021 0104 ketorolac (TORADOL) injection 30 mg 30 mg Intravenous Given     01/05/2021 0420 morphine (PF) 4 mg/mL injection 4 mg 4 mg Intravenous Given        Past Medical History:   Diagnosis Date    Asthma          Admitting Diagnosis: Rectal pain [K62 89]  Age/Sex: 38 y o  male  Admission Orders:  Scheduled Medications:  ketorolac, 30 mg, Intravenous, Q6H [facility]  nicotine, 1 patch, Transdermal, Daily  oxybutynin, 5 mg, Oral, 4x Daily  tamsulosin, 0 4 mg, Oral, Daily With Dinner      Continuous IV Infusions:  sodium chloride, 125 mL/hr, Intravenous, Continuous      PRN Meds:  acetaminophen, 650 mg, Oral, Q6H PRN  albuterol, 2 puff, Inhalation, Q4H PRN  belladonna-opium, 30 mg, Rectal, Q8H PRN  morphine injection, 4 mg, Intravenous, Q4H PRN x1 1/5  ondansetron, 4 mg, Intravenous, Q6H PRN      OOB as ana  NPO    IP CONSULT TO COLORECTAL SURGERY  IP CONSULT TO UROLOGY    Network Utilization Review Department  ATTENTION: Please call with any questions or concerns to [phone] and carefully listen to the prompts so that you are directed to the right person  All voicemails are confidential   Shae Salem all requests for admission clinical reviews, approved or denied determinations and any other requests to dedicated fax number below belonging to the campus where the patient is receiving treatment   List of dedicated fax numbers for the Facilities:  [address] DENIALS (Administrative/Medical Necessity) [phone]   [address] (Maternity/NICU/Pediatrics) [phone]   [address] [place] [address] Dr Yesi Blanton 3825 (  Leodan Parker "[address]" 103) [address] [address] [address] [address] Caldera 1481 [address] [place]) [address] [place] [address] 951 [phone]

## 2021-01-05 NOTE — CASE MANAGEMENT
Admitted under observation, observation notice previously reviewed and provided, not a bundle or readmission  Patient has 2 admissions this month to [facility] ED however was discharged home  Patient was compliant with OP follow up per chart review  Per chart review patient does not have PCP  CM added [employer] to follow up providers  Per conversation with care team patient is independent with no anticipated CM needs  No needs are identified at this time of the initial assessment, care manager will respond upon request or reassess patient for discharge needs as appropriate

## 2021-01-05 NOTE — ED NOTES
Pt utilizing mychal  Pt states "pain meds" upon this RN entering  Asked pt for clarification  Pt states "I said I want my pain meds" pt offered tylenol/belladonna  Pt refusing  Made pt aware he received toradol 3 hours ago and must wait until the order is ready  Pt reports "theres morphine on my chart  I know it is  I want that"  Made pt aware of the appropriateness of the belladonna, pt continues to refuse, asking for morphine  S/p morphine admin, pt states "can you flush the iv" made pt aware the iv was flushed and he was continual fluids running  Pt states "I didn't taste the saline "       Rebeca Morillo, MARITZA  01/05/21 8958

## 2021-01-05 NOTE — H&P
H&P- Aldhuhuntiquan [place] Red 1985, 38 y o  male MRN: [id number]    Unit/Bed#: ED 26 Encounter: [id number]    Primary Care Provider: No primary care provider on file  Date and time admitted to hospital: 1/4/2021 12:15 PM        Thrombosed external hemorrhoid  Assessment & Plan  · 2/2 straining w/ urination  · S/p agata in the ED by CRS  · Pain control, sitz baths    * Colicky RLQ abdominal pain 2/2 ureteral stent  Assessment & Plan  · Patient w/ persistent pain/hematuria since placement of stent in early December  Patient has also been straining w/ urination to a point where he developed a thrombosed hemorrhoid   · CT A/P shows R sided stent and no further evidence of ureteral stone-- likely this has passed; no noted hydronephrosis  · Plan overnight: IVF, pain control, NPO  · Urology evaluation in the AM       VTE Prophylaxis: Pharmacologic VTE Prophylaxis contraindicated due to Low risk  / reason for no mechanical VTE prophylaxis Low risk   Code Status:  Level 1  POLST: POLST form is not discussed and not completed at this time  Discussion with family:  Family not available    Anticipated Length of Stay:  Patient will be admitted on an Observation basis with an anticipated length of stay of  less than 2 midnights  Justification for Hospital Stay:  Treatment evaluation of stent colic    Total Time for Visit, including Counseling / Coordination of Care: 30 minutes  Greater than 50% of this total time spent on direct patient counseling and coordination of care  Chief Complaint:   Right lower quadrant pain    History of Present Illness:    Reginald Campbell is a 38 y o  male with past medical history significant for right kidney stone status post cystoscopy with lithotripsy and stent placement presents to the ED for evaluation of right lower quadrant abdominal pain  Patient underwent prior procedure in early December    Since then, patient has had persistent pain, hematuria, straining with urination, weak in stream, decreased urine output  Patient has been in and out of the ER and consulting with outpatient urology office to assist with pain management and planning of stent removal   Overnight, patient strained with urinating to the point where he thrombosed hemorrhoid  Given significant pain, patient came to the ED for further evaluation  Multiple pain management modalities were employed in the ED, and patient currently rates his pain 7/10  Review of Systems:    Review of Systems   Constitutional: Negative  HENT: Negative  Eyes: Negative  Respiratory: Negative  Cardiovascular: Negative  Gastrointestinal: Positive for abdominal pain  Genitourinary: Positive for decreased urine volume, difficulty urinating and hematuria  Musculoskeletal: Negative  Skin: Negative  Neurological: Negative  Hematological: Negative  Psychiatric/Behavioral: Negative  Past Medical and Surgical History:     Past Medical History:   Diagnosis Date    Asthma        Past Surgical History:   Procedure Laterality Date    JOINT REPLACEMENT Right     knee    KNEE SURGERY      WI CYSTO/URETERO W/LITHOTRIPSY &INDWELL STENT INSRT Right 12/2/2020    Procedure: CYSTOSCOPY URETEROSCOPY WITH LITHOTRIPSY HOLMIUM LASER, RETROGRADE PYELOGRAM, STONE BASKET RETRIEVAL, URETHRAL DILATION  AND INSERTION STENT URETERAL;  Surgeon: Jason Workman MD;  Location: [address] [place] [address];  Service: Urology    SHOULDER SURGERY Right        Meds/Allergies:    Prior to Admission medications    Medication Sig Start Date End Date Taking?  Authorizing Provider   albuterol (PROVENTIL HFA,VENTOLIN HFA) 90 mcg/act inhaler Inhale 2 puffs every 4 (four) hours as needed   Yes Historical Provider, MD   naproxen (NAPROSYN) 500 mg tablet Take 1 tablet by mouth 2 (two) times a day   Yes Historical Provider, MD   oxybutynin (DITROPAN) 5 mg tablet Take 1 tablet (5 mg total) by mouth 3 (three) times a day as needed (as needed) 12/31/20 Yes SANA Johnson   tamsulosin (FLOMAX) 0 4 mg Take 1 capsule (0 4 mg total) by mouth daily with dinner 12/31/20  SANA Echeverria   docusate sodium (COLACE) 100 mg capsule Take 1 capsule (100 mg total) by mouth every 12 (twelve) hours 1/4/21   Onelia Cardenas MD   HYDROcodone-acetaminophen (NORCO) 5-325 mg per tablet Take 1 tablet by mouth every 6 (six) hours as needed for pain for up to 10 daysMax Daily Amount: 4 tablets 1/4/21 1/14/21  Onelia Cardenas MD   naproxen (NAPROSYN) 500 mg tablet Take 1 tablet (500 mg total) by mouth 2 (two) times a day with meals 1/4/21   Onelia Cardenas MD   nicotine (NICODERM CQ) 14 mg/24hr TD 24 hr patch Place 1 patch on the skin daily  Patient not taking: Reported on 12/31/2020 12/3/20   SANA Kaur   ondansetron (ZOFRAN-ODT) 4 mg disintegrating tablet Take 1 tablet (4 mg total) by mouth every 6 (six) hours as needed for nausea or vomiting for up to 3 days 12/15/20 12/31/20  Ale Contreras DO   oxyCODONE (ROXICODONE) 5 mg immediate release tablet Take 1 tablet (5 mg total) by mouth every 4 (four) hours as needed for moderate pain for up to 20 dosesMax Daily Amount: 30 mg  Patient not taking: Reported on 12/28/2020 12/2/20   SANA Kaur   phenazopyridine (PYRIDIUM) 200 mg tablet Take 1 tablet (200 mg total) by mouth 3 (three) times a day 1/4/21   Onelia Cardenas MD   polyethylene glycol [facility]) 17 GM/SCOOP powder Take 17 g by mouth daily 1/4/21 2/3/21  Onelia Cardenas MD   tamsulosin (FLOMAX) 0 4 mg Take 1 capsule (0 4 mg total) by mouth daily with dinner for 14 days 1/4/21 1/18/21  Onelia Cardenas MD   traMADol [place]) 50 mg tablet 1-2 po q 6 hours prn pain  Patient not taking: Reported on 12/31/2020 69/71/72   Miko Bess MD     I have reviewed home medications with patient personally  Allergies:    Allergies   Allergen Reactions    Latex     Levofloxacin     Penicillins     Rocephin [Ceftriaxone]     Shellfish-Derived Products     Coconut Oil Rash       Social History:     Marital Status:    Occupation:  Unemployed  Patient Pre-hospital Living Situation:  Independent  Patient Pre-hospital Level of Mobility:  Independent  Patient Pre-hospital Diet Restrictions:  None  Substance Use History:   Social History     Substance and Sexual Activity   Alcohol Use Not Currently     Social History     Tobacco Use   Smoking Status Current Every Day Smoker    Packs/day: 0 50    Types: Cigarettes   Smokeless Tobacco Never Used     Social History     Substance and Sexual Activity   Drug Use Never       Family History:    History reviewed  No pertinent family history  Physical Exam:     Vitals:   Blood Pressure: 123/66 (01/04/21 2004)  Pulse: 89 (01/04/21 2004)  Temperature: 97 9 °F (36 6 °C) (01/04/21 1112)  Temp Source: Oral (01/04/21 1112)  Respirations: 16 (01/04/21 2004)  Weight - Scale: 99 8 kg (220 lb) (01/04/21 1112)  SpO2: 96 % (01/04/21 2004)    Physical Exam  Constitutional:       General: He is not in acute distress  Appearance: Normal appearance  HENT:      Head: Normocephalic and atraumatic  Mouth/Throat:      Mouth: Mucous membranes are moist    Eyes:      Pupils: Pupils are equal, round, and reactive to light  Cardiovascular:      Rate and Rhythm: Normal rate and regular rhythm  Pulmonary:      Effort: Pulmonary effort is normal       Breath sounds: Wheezing (Slight end-expiratory wheezing) present  Abdominal:      General: Abdomen is flat  There is no distension  Palpations: Abdomen is soft  There is no mass  Tenderness: There is abdominal tenderness ( right lower quadrant/right groin)  Musculoskeletal: Normal range of motion  Right lower leg: No edema  Left lower leg: No edema  Skin:     General: Skin is warm and dry  Neurological:      General: No focal deficit present  Mental Status: He is alert and oriented to person, place, and time         Additional Data:     Lab Results: I have personally reviewed pertinent reports  Results from last 7 days   Lab Units 01/04/21  1306   WBC Thousand/uL 7 97   HEMOGLOBIN g/dL 12 7   HEMATOCRIT % 39 7   PLATELETS Thousands/uL 308   NEUTROS PCT % 40*   LYMPHS PCT % 47*   MONOS PCT % 9   EOS PCT % 4     Results from last 7 days   Lab Units 01/04/21  1306   SODIUM mmol/L 136   POTASSIUM mmol/L 4 1   CHLORIDE mmol/L 103   CO2 mmol/L 26   BUN mg/dL 16   CREATININE mg/dL 1 03   ANION GAP mmol/L 7   CALCIUM mg/dL 8 8   GLUCOSE RANDOM mg/dL 95                       Imaging: I have personally reviewed pertinent reports  CT renal stone study abdomen pelvis wo contrast   Final Result by Dia Ayala MD (01/04 1405)      1  Right nephroureteral stent in place  No definite calculi seen along the course of the stent or within the right kidney  2   Unremarkable left kidney  Workstation performed: [id number]             EKG, Pathology, and Other Studies Reviewed on Admission:   · EKG:  Not available    Allscripts / [facility] Records Reviewed: Yes     ** Please Note: This note has been constructed using a voice recognition system   **

## 2021-01-05 NOTE — ED NOTES
Sophia and I attempted to change patients sheets, as they were wet  Patient refused and stated he wants to go home       Ciara Olson  01/05/21 1046

## 2021-01-05 NOTE — ASSESSMENT & PLAN NOTE
· Patient w/ persistent pain/hematuria since placement of stent in early December  Patient has also been straining w/ urination to a point where he developed a thrombosed hemorrhoid   · CT A/P shows R sided stent and no further evidence of ureteral stone-- likely this has passed; no noted hydronephrosis  · I did speak to Urology  They did evaluate the patient  Plan is pending per them

## 2021-01-05 NOTE — ED NOTES
While disconnecting IV fluids from hand, a small amount of IV fluids leaked onto the bed  PT refused linen change       April Suzy Jo RN  01/05/21 1889

## 2021-01-05 NOTE — PROGRESS NOTES
Progress Note - Jolynn Cottrell 1985, 38 y o  male MRN: [id number]    Unit/Bed#: ED 26 Encounter: [id number]    Primary Care Provider: No primary care provider on file  Date and time admitted to hospital: 2021 12:15 PM        Thrombosed external hemorrhoid  Assessment & Plan  · 2/2 straining w/ urination  · S/p agata in the ED by CRS  · Pain control, sitz baths  Follow up with Colorectal surgery    * Colicky RLQ abdominal pain 2/2 ureteral stent  Assessment & Plan  · Patient w/ persistent pain/hematuria since placement of stent in early December  Patient has also been straining w/ urination to a point where he developed a thrombosed hemorrhoid   · CT A/P shows R sided stent and no further evidence of ureteral stone-- likely this has passed; no noted hydronephrosis  · I did speak to Urology  They did evaluate the patient  Plan is pending per them  VTE Pharmacologic Prophylaxis:   Pharmacologic: No risk factors  Encourage ambulation much as able in the room  Mechanical VTE Prophylaxis in Place: Yes    Patient Centered Rounds: I have performed bedside rounds with nursing staff today  Discussions with Specialists or Other Care Team Provider:  Urology      Time Spent for Care: 20 minutes  More than 50% of total time spent on counseling and coordination of care as described above  Current Length of Stay: 0 day(s)    Current Patient Status: Observation   Certification Statement: The patient, admitted on an observation basis, will now require > 2 midnight hospital stay due to Needing urological plan for continued pain    Discharge Plan:  Hopeful discharge either today tomorrow depending on urology plan care and recommendation    Code Status: Level 1 - Full Code      Subjective:   Patient seen examined    Still complaining of some intractable pain on the right flank    Objective:     Vitals:   Temp (24hrs), Av 7 °F (37 1 °C), Min:98 7 °F (37 1 °C), Max:98 7 °F (37 1 °C)    Temp: [98 7 °F (37 1 °C)] 98 7 °F (37 1 °C)  HR:  [60-93] 92  Resp:  [16] 16  BP: (112-138)/(58-79) 138/65  SpO2:  [95 %-100 %] 95 %  Body mass index is 34 46 kg/m²  Input and Output Summary (last 24 hours): Intake/Output Summary (Last 24 hours) at 1/5/2021 1614  Last data filed at 1/5/2021 1135  Gross per 24 hour   Intake 2000 ml   Output 1000 ml   Net 1000 ml       Physical Exam:     Physical Exam  (   General Appearance:    Alert, cooperative, no distress, appears stated age                               Lungs:     Clear to auscultation bilaterally, respirations unlabored       Heart:    Regular rate and rhythm, S1 and S2 normal, no murmur, rub    or gallop   Abdomen:     Soft, non-tender, bowel sounds active all four quadrants,     no masses, no organomegaly           Extremities:   Extremities normal, atraumatic, no cyanosis or edema       Additional Data:     Labs:    Results from last 7 days   Lab Units 01/05/21  0525 01/04/21  1306   WBC Thousand/uL 8 28 7 97   HEMOGLOBIN g/dL 12 3 12 7   HEMATOCRIT % 38 3 39 7   PLATELETS Thousands/uL 253 308   NEUTROS PCT %  --  40*   LYMPHS PCT %  --  47*   MONOS PCT %  --  9   EOS PCT %  --  4     Results from last 7 days   Lab Units 01/05/21  0525   SODIUM mmol/L 136   POTASSIUM mmol/L 4 0   CHLORIDE mmol/L 106   CO2 mmol/L 25   BUN mg/dL 13   CREATININE mg/dL 1 05   ANION GAP mmol/L 5   CALCIUM mg/dL 8 3   GLUCOSE RANDOM mg/dL 86                           * I Have Reviewed All Lab Data Listed Above  * Additional Pertinent Lab Tests Reviewed:  [facility]        Recent Cultures (last 7 days):     Results from last 7 days   Lab Units 01/04/21  1308   URINE CULTURE  No Growth <1000 cfu/mL       Last 24 Hours Medication List:   Current Facility-Administered Medications   Medication Dose Route Frequency Provider Last Rate    acetaminophen  650 mg Oral Q6H PRN Renae Jean Baptiste PA-C      albuterol  2 puff Inhalation Q4H PRN Renae Jean Baptiste PA-C      belladonna-opium  30 mg Rectal Q8H PRN Renae Jean Baptiste PA-C      ketorolac  30 mg Intravenous Q6H [address] Renae Jean Baptiste PA-C      morphine injection  4 mg Intravenous Q4H PRN Renae Jean Baptiste PA-C      nicotine  1 patch Transdermal Daily Renae Jean Baptiste PA-C      ondansetron  4 mg Intravenous Q6H PRN Renae Jean Baptiste PA-C      oxybutynin  5 mg Oral 4x Daily Theone Cue, CRNP      phenazopyridine  100 mg Oral TID With Meals Theone Cue, CRNP      sodium chloride  125 mL/hr Intravenous Continuous Renae Jean Baptiste PA-C 125 mL/hr (01/05/21 1349)    tamsulosin  0 4 mg Oral Daily With Dinner Renae Jean Baptiste PA-C          Today, Patient Was Seen By: Dali Obrien MD    ** Please Note: Dictation voice to text software may have been used in the creation of this document   **

## 2021-01-05 NOTE — PHYSICIAN ADVISOR
Current patient class: Observation  The patient is currently on Hospital Day: 2 at [address]        The patient was admitted to the hospital  on N/A at N/A for the following diagnosis:  Rectal pain [K62 [id number]]     After review of the relevant documentation, labs, vital signs and test results, the patient is most appropriate for OBSERVATION CLASS  Rationale is as follows:    [facility] case  Admitted under observation 20:37 yesterday  Thrombosed external hemorrhoid  Colicky right lower quadrant pain  The provider spoke to Urology and the patient did not require any urgent urologic intervention  The patient was evaluated for discharge today however was still having intractable right flank pain in the late afternoon  He did receive morphine and Toradol today  He is on intravenous fluids  I recommend maintaining observation class tonight  If the patient is unable to be discharged tomorrow because of ongoing pain or other acute issues, then I recommend change to inpatient class on 1/6/21      The patients vitals on arrival were   ED Triage Vitals   Temperature Pulse Respirations Blood Pressure SpO2   01/04/21 1112 01/04/21 1112 01/04/21 1112 01/04/21 1112 01/04/21 1112   97 9 °F (36 6 °C) 96 18 154/81 100 %      Temp Source Heart Rate Source Patient Position - Orthostatic VS BP Location FiO2 (%)   01/04/21 1112 01/04/21 1112 01/04/21 1112 01/04/21 1112 --   Oral Monitor Lying Right arm       Pain Score       01/04/21 1526       Worst Possible Pain           Past Medical History:   Diagnosis Date    Asthma      Past Surgical History:   Procedure Laterality Date    JOINT REPLACEMENT Right     knee    KNEE SURGERY      TN CYSTO/URETERO W/LITHOTRIPSY &INDWELL STENT INSRT Right 12/2/2020    Procedure: CYSTOSCOPY URETEROSCOPY WITH LITHOTRIPSY HOLMIUM LASER, RETROGRADE PYELOGRAM, STONE BASKET RETRIEVAL, URETHRAL DILATION  AND INSERTION STENT URETERAL;  Surgeon: Sugar Yip MD; Location: [facility] OR;  Service: Urology    SHOULDER SURGERY Right            Consults have been placed to:   IP CONSULT TO COLORECTAL SURGERY  IP CONSULT TO UROLOGY    Vitals:    01/05/21 1133 01/05/21 1348 01/05/21 1527 01/05/21 1825   BP: 128/79 138/74 138/65 136/84   BP Location: Right arm Right arm Right arm Right arm   Pulse: 68 93 92 78   Resp: 16 16 16 18   Temp:    99 6 °F (37 6 °C)   TempSrc:    Oral   SpO2: 100% 98% 95% 100%   Weight:           Most recent labs:    Recent Labs     01/05/21  0525   WBC 8 28   HGB 12 3   HCT 38 3      K 4 0   CALCIUM 8 3   BUN 13   CREATININE 1 05       Scheduled Meds:  Current Facility-Administered Medications   Medication Dose Route Frequency Provider Last Rate    acetaminophen  650 mg Oral Q6H PRN Renae Jean Baptiste PA-C      albuterol  2 puff Inhalation Q4H PRN Renae Jean Baptiste PA-C      belladonna-opium  30 mg Rectal Q8H PRN Renae Jean Baptiste PA-C      ketorolac  30 mg Intravenous Q6H [address] Renae Jean Baptiste PA-C      morphine injection  4 mg Intravenous Q4H PRN Renae Jean Baptiste PA-C      nicotine  1 patch Transdermal Daily Renae Jean Baptiste PA-C      ondansetron  4 mg Intravenous Q6H PRN Renae Jean Baptiste PA-C      oxybutynin  5 mg Oral 4x Daily Gracewood Crate, CRNP      [START ON 1/6/2021] phenazopyridine  200 mg Oral TID With Meals Gracewood Crate, CRNP      sodium chloride  125 mL/hr Intravenous Continuous Renae Jean Baptiste PA-C 125 mL/hr (01/05/21 1349)    tamsulosin  0 4 mg Oral Daily With Dinner Renae Jean Baptiste PA-C       Continuous Infusions:sodium chloride, 125 mL/hr, Last Rate: 125 mL/hr (01/05/21 1349)      PRN Meds:   acetaminophen    albuterol    belladonna-opium    morphine injection    ondansetron

## 2021-01-06 ENCOUNTER — ANESTHESIA EVENT (OUTPATIENT)
Dept: PERIOP | Facility: HOSPITAL | Age: 36
End: 2021-01-06
Payer: COMMERCIAL

## 2021-01-06 ENCOUNTER — ANESTHESIA (OUTPATIENT)
Dept: PERIOP | Facility: HOSPITAL | Age: 36
End: 2021-01-06
Payer: COMMERCIAL

## 2021-01-06 ENCOUNTER — APPOINTMENT (OUTPATIENT)
Dept: RADIOLOGY | Facility: HOSPITAL | Age: 36
End: 2021-01-06
Payer: COMMERCIAL

## 2021-01-06 ENCOUNTER — TELEPHONE (OUTPATIENT)
Dept: UROLOGY | Facility: MEDICAL CENTER | Age: 36
End: 2021-01-06

## 2021-01-06 VITALS — HEART RATE: 87 BPM

## 2021-01-06 DIAGNOSIS — N20.1 URETERAL STONE: Primary | ICD-10-CM

## 2021-01-06 PROBLEM — E66.9 CLASS 1 OBESITY IN ADULT: Status: RESOLVED | Noted: 2020-12-02 | Resolved: 2021-01-06

## 2021-01-06 PROCEDURE — 99225 PR SBSQ OBSERVATION CARE/DAY 25 MINUTES: CPT | Performed by: UROLOGY

## 2021-01-06 PROCEDURE — 52310 CYSTOSCOPY AND TREATMENT: CPT | Performed by: UROLOGY

## 2021-01-06 PROCEDURE — C1769 GUIDE WIRE: HCPCS | Performed by: UROLOGY

## 2021-01-06 PROCEDURE — 99225 PR SBSQ OBSERVATION CARE/DAY 25 MINUTES: CPT | Performed by: PHYSICIAN ASSISTANT

## 2021-01-06 RX ORDER — DIPHENHYDRAMINE HYDROCHLORIDE 50 MG/ML
12.5 INJECTION INTRAMUSCULAR; INTRAVENOUS ONCE AS NEEDED
Status: DISCONTINUED | OUTPATIENT
Start: 2021-01-06 | End: 2021-01-06 | Stop reason: HOSPADM

## 2021-01-06 RX ORDER — KETAMINE HCL IN NACL, ISO-OSM 100MG/10ML
SYRINGE (ML) INJECTION AS NEEDED
Status: DISCONTINUED | OUTPATIENT
Start: 2021-01-06 | End: 2021-01-06

## 2021-01-06 RX ORDER — HYDROMORPHONE HCL/PF 1 MG/ML
0.5 SYRINGE (ML) INJECTION
Status: DISCONTINUED | OUTPATIENT
Start: 2021-01-06 | End: 2021-01-06 | Stop reason: HOSPADM

## 2021-01-06 RX ORDER — ALBUTEROL SULFATE 2.5 MG/3ML
2.5 SOLUTION RESPIRATORY (INHALATION) ONCE
Status: COMPLETED | OUTPATIENT
Start: 2021-01-06 | End: 2021-01-06

## 2021-01-06 RX ORDER — LIDOCAINE HYDROCHLORIDE 10 MG/ML
INJECTION, SOLUTION EPIDURAL; INFILTRATION; INTRACAUDAL; PERINEURAL AS NEEDED
Status: DISCONTINUED | OUTPATIENT
Start: 2021-01-06 | End: 2021-01-06

## 2021-01-06 RX ORDER — DEXAMETHASONE SODIUM PHOSPHATE 4 MG/ML
INJECTION, SOLUTION INTRA-ARTICULAR; INTRALESIONAL; INTRAMUSCULAR; INTRAVENOUS; SOFT TISSUE AS NEEDED
Status: DISCONTINUED | OUTPATIENT
Start: 2021-01-06 | End: 2021-01-06

## 2021-01-06 RX ORDER — PROPOFOL 10 MG/ML
INJECTION, EMULSION INTRAVENOUS AS NEEDED
Status: DISCONTINUED | OUTPATIENT
Start: 2021-01-06 | End: 2021-01-06

## 2021-01-06 RX ORDER — ONDANSETRON 2 MG/ML
INJECTION INTRAMUSCULAR; INTRAVENOUS AS NEEDED
Status: DISCONTINUED | OUTPATIENT
Start: 2021-01-06 | End: 2021-01-06

## 2021-01-06 RX ORDER — MAGNESIUM HYDROXIDE 1200 MG/15ML
LIQUID ORAL AS NEEDED
Status: DISCONTINUED | OUTPATIENT
Start: 2021-01-06 | End: 2021-01-06 | Stop reason: HOSPADM

## 2021-01-06 RX ORDER — LIDOCAINE HYDROCHLORIDE 20 MG/ML
JELLY TOPICAL AS NEEDED
Status: DISCONTINUED | OUTPATIENT
Start: 2021-01-06 | End: 2021-01-06 | Stop reason: HOSPADM

## 2021-01-06 RX ORDER — OXYCODONE HYDROCHLORIDE 5 MG/1
5 TABLET ORAL EVERY 4 HOURS PRN
Status: DISCONTINUED | OUTPATIENT
Start: 2021-01-06 | End: 2021-01-07 | Stop reason: HOSPADM

## 2021-01-06 RX ORDER — FENTANYL CITRATE/PF 50 MCG/ML
50 SYRINGE (ML) INJECTION
Status: COMPLETED | OUTPATIENT
Start: 2021-01-06 | End: 2021-01-06

## 2021-01-06 RX ORDER — MEPERIDINE HYDROCHLORIDE 25 MG/ML
12.5 INJECTION INTRAMUSCULAR; INTRAVENOUS; SUBCUTANEOUS ONCE AS NEEDED
Status: DISCONTINUED | OUTPATIENT
Start: 2021-01-06 | End: 2021-01-06 | Stop reason: HOSPADM

## 2021-01-06 RX ORDER — SODIUM CHLORIDE, SODIUM LACTATE, POTASSIUM CHLORIDE, CALCIUM CHLORIDE 600; 310; 30; 20 MG/100ML; MG/100ML; MG/100ML; MG/100ML
INJECTION, SOLUTION INTRAVENOUS CONTINUOUS PRN
Status: DISCONTINUED | OUTPATIENT
Start: 2021-01-06 | End: 2021-01-06

## 2021-01-06 RX ORDER — METOCLOPRAMIDE HYDROCHLORIDE 5 MG/ML
10 INJECTION INTRAMUSCULAR; INTRAVENOUS ONCE AS NEEDED
Status: DISCONTINUED | OUTPATIENT
Start: 2021-01-06 | End: 2021-01-06 | Stop reason: HOSPADM

## 2021-01-06 RX ORDER — CEFAZOLIN SODIUM 2 G/50ML
2000 SOLUTION INTRAVENOUS ONCE
Status: COMPLETED | OUTPATIENT
Start: 2021-01-06 | End: 2021-01-06

## 2021-01-06 RX ORDER — MIDAZOLAM HYDROCHLORIDE 2 MG/2ML
INJECTION, SOLUTION INTRAMUSCULAR; INTRAVENOUS AS NEEDED
Status: DISCONTINUED | OUTPATIENT
Start: 2021-01-06 | End: 2021-01-06

## 2021-01-06 RX ORDER — PROMETHAZINE HYDROCHLORIDE 25 MG/ML
25 INJECTION, SOLUTION INTRAMUSCULAR; INTRAVENOUS ONCE AS NEEDED
Status: DISCONTINUED | OUTPATIENT
Start: 2021-01-06 | End: 2021-01-06 | Stop reason: HOSPADM

## 2021-01-06 RX ORDER — KETOROLAC TROMETHAMINE 30 MG/ML
30 INJECTION, SOLUTION INTRAMUSCULAR; INTRAVENOUS EVERY 6 HOURS SCHEDULED
Status: DISCONTINUED | OUTPATIENT
Start: 2021-01-06 | End: 2021-01-07 | Stop reason: HOSPADM

## 2021-01-06 RX ORDER — FENTANYL CITRATE 50 UG/ML
INJECTION, SOLUTION INTRAMUSCULAR; INTRAVENOUS AS NEEDED
Status: DISCONTINUED | OUTPATIENT
Start: 2021-01-06 | End: 2021-01-06

## 2021-01-06 RX ADMIN — KETOROLAC TROMETHAMINE 30 MG: 30 INJECTION, SOLUTION INTRAMUSCULAR at 12:42

## 2021-01-06 RX ADMIN — FENTANYL CITRATE 50 MCG: 50 INJECTION INTRAMUSCULAR; INTRAVENOUS at 17:34

## 2021-01-06 RX ADMIN — ONDANSETRON 4 MG: 2 INJECTION INTRAMUSCULAR; INTRAVENOUS at 17:00

## 2021-01-06 RX ADMIN — MORPHINE SULFATE 4 MG: 4 INJECTION INTRAVENOUS at 13:36

## 2021-01-06 RX ADMIN — OXYBUTYNIN CHLORIDE 5 MG: 5 TABLET ORAL at 12:42

## 2021-01-06 RX ADMIN — OXYBUTYNIN CHLORIDE 5 MG: 5 TABLET ORAL at 21:54

## 2021-01-06 RX ADMIN — KETOROLAC TROMETHAMINE 30 MG: 30 INJECTION, SOLUTION INTRAMUSCULAR at 19:11

## 2021-01-06 RX ADMIN — DEXAMETHASONE SODIUM PHOSPHATE 4 MG: 4 INJECTION INTRA-ARTICULAR; INTRALESIONAL; INTRAMUSCULAR; INTRAVENOUS; SOFT TISSUE at 17:00

## 2021-01-06 RX ADMIN — ONDANSETRON 4 MG: 2 INJECTION INTRAMUSCULAR; INTRAVENOUS at 05:32

## 2021-01-06 RX ADMIN — KETOROLAC TROMETHAMINE 30 MG: 30 INJECTION, SOLUTION INTRAMUSCULAR at 21:54

## 2021-01-06 RX ADMIN — PHENAZOPYRIDINE 200 MG: 100 TABLET ORAL at 08:14

## 2021-01-06 RX ADMIN — HYDROMORPHONE HYDROCHLORIDE 0.5 MG: 1 INJECTION, SOLUTION INTRAMUSCULAR; INTRAVENOUS; SUBCUTANEOUS at 18:05

## 2021-01-06 RX ADMIN — OXYCODONE HYDROCHLORIDE 5 MG: 5 TABLET ORAL at 11:06

## 2021-01-06 RX ADMIN — CEFAZOLIN SODIUM 2000 MG: 2 SOLUTION INTRAVENOUS at 16:45

## 2021-01-06 RX ADMIN — Medication 20 MG: at 17:08

## 2021-01-06 RX ADMIN — LIDOCAINE HYDROCHLORIDE 50 MG: 10 INJECTION, SOLUTION EPIDURAL; INFILTRATION; INTRACAUDAL at 16:56

## 2021-01-06 RX ADMIN — Medication 1 PATCH: at 08:16

## 2021-01-06 RX ADMIN — OXYBUTYNIN CHLORIDE 5 MG: 5 TABLET ORAL at 19:12

## 2021-01-06 RX ADMIN — FENTANYL CITRATE 50 MCG: 50 INJECTION INTRAMUSCULAR; INTRAVENOUS at 17:48

## 2021-01-06 RX ADMIN — PHENAZOPYRIDINE 200 MG: 100 TABLET ORAL at 19:12

## 2021-01-06 RX ADMIN — PHENAZOPYRIDINE 200 MG: 100 TABLET ORAL at 12:42

## 2021-01-06 RX ADMIN — PROPOFOL 200 MG: 10 INJECTION, EMULSION INTRAVENOUS at 16:56

## 2021-01-06 RX ADMIN — HYDROMORPHONE HYDROCHLORIDE 0.5 MG: 1 INJECTION, SOLUTION INTRAMUSCULAR; INTRAVENOUS; SUBCUTANEOUS at 17:52

## 2021-01-06 RX ADMIN — TAMSULOSIN HYDROCHLORIDE 0.4 MG: 0.4 CAPSULE ORAL at 19:11

## 2021-01-06 RX ADMIN — SODIUM CHLORIDE, SODIUM LACTATE, POTASSIUM CHLORIDE, AND CALCIUM CHLORIDE: .6; .31; .03; .02 INJECTION, SOLUTION INTRAVENOUS at 16:43

## 2021-01-06 RX ADMIN — MORPHINE SULFATE 4 MG: 4 INJECTION INTRAVENOUS at 20:17

## 2021-01-06 RX ADMIN — MIDAZOLAM HYDROCHLORIDE 2 MG: 1 INJECTION, SOLUTION INTRAMUSCULAR; INTRAVENOUS at 16:49

## 2021-01-06 RX ADMIN — MORPHINE SULFATE 4 MG: 4 INJECTION INTRAVENOUS at 05:26

## 2021-01-06 RX ADMIN — FENTANYL CITRATE 50 MCG: 50 INJECTION INTRAMUSCULAR; INTRAVENOUS at 17:43

## 2021-01-06 RX ADMIN — ALBUTEROL SULFATE 2.5 MG: 2.5 SOLUTION RESPIRATORY (INHALATION) at 16:13

## 2021-01-06 RX ADMIN — FENTANYL CITRATE 50 MCG: 50 INJECTION INTRAMUSCULAR; INTRAVENOUS at 17:38

## 2021-01-06 RX ADMIN — Medication 30 MG: at 16:56

## 2021-01-06 RX ADMIN — OXYCODONE HYDROCHLORIDE 5 MG: 5 TABLET ORAL at 19:25

## 2021-01-06 RX ADMIN — FENTANYL CITRATE 50 MCG: 50 INJECTION, SOLUTION INTRAMUSCULAR; INTRAVENOUS at 17:08

## 2021-01-06 RX ADMIN — OXYBUTYNIN CHLORIDE 5 MG: 5 TABLET ORAL at 08:17

## 2021-01-06 RX ADMIN — KETOROLAC TROMETHAMINE 30 MG: 30 INJECTION, SOLUTION INTRAMUSCULAR at 03:42

## 2021-01-06 NOTE — PROGRESS NOTES
Progress Note - Aldhuhuntiqualek Zendejas 1985, 38 y o  male MRN: [id number]    Unit/Bed#: W -01 Encounter: [id number]    Primary Care Provider: No primary care provider on file  Date and time admitted to hospital: 1/4/2021 59:91 PM        * Colicky RLQ abdominal pain 2/2 ureteral stent  Assessment & Plan  · Per record review, patient was admitted to [facility] at the [facility] December and was noted to have right-sided nephrolithiasis with a 3mm obstructing stone in the distal right ureter with mild right hydroureteronephrosis proximal to this  Medical therapy was attempted but he was unable to pass the stone  He underwent cystoscopy, retrograde pyelogram, right ureteroscopy, right laser lithotripsy and right ureteral stent placement on 12/2/2020  He was discharged that same day  He had ED visits on 12/15/2020 and 12/28/2020 for flank pain  He had outpatient follow up on 12/31/2020 with Urology  On 1/4/21 he presented back to the ED with right lower quadrant abdominal pain and had been straining to urinate to the point where he thrombosed a hemorrhoid  CT this admission revealed right nephroureteral stent in place with no definite calculi seen  · Urology following, I discussed with them today via TT  Appreciate their ongoing recommendations  Patient was made NPO by Urology  · Currently on Flomax, Ditropan, Pyridium, and B/O suppositories     Thrombosed external hemorrhoid  Assessment & Plan  · 2/2 straining w/ urination  · S/p agata in the ED by CRS  · Pain control, sitz baths  Follow up with Colorectal surgery    Asthma  Assessment & Plan  · Albuterol on board PRN    Class 1 obesity in adult  Assessment & Plan  · Body mass index is 31 08 kg/m²  · Lifestyle modification       VTE Pharmacologic Prophylaxis:   Pharmacologic: ambulatory   Mechanical VTE Prophylaxis in Place: No    Patient Centered Rounds: I have performed bedside rounds with nursing staff today   Phong    Discussions with Specialists or Other Care Team Provider: Almaz Quan with Urology     Education and Discussions with Family / Patient: patient and his wife Syed Almanza over the phone at the patient's request     Time Spent for Care: 30 minutes  More than 50% of total time spent on counseling and coordination of care as described above  Current Length of Stay: 0 day(s)    Current Patient Status: Observation   Certification Statement: The patient, admitted on an observation basis, will now require > 2 midnight hospital stay due to continued symptom control and pending Urology plan     Discharge Plan: pending symptom control and Urology plan    Code Status: Level 1 - Full Code      Subjective:   Mr Rebecca Cook reports right side pain which radiates to his groin  He reports nausea  He reports no longer straining to pee  He says he had hematuria yesterday    Objective:     Vitals:   Temp (24hrs), Av 8 °F (37 1 °C), Min:97 7 °F (36 5 °C), Max:99 7 °F (37 6 °C)    Temp:  [97 7 °F (36 5 °C)-99 7 °F (37 6 °C)] 97 7 °F (36 5 °C)  HR:  [59-93] 59  Resp:  [15-18] 15  BP: (118-138)/(65-84) 124/80  SpO2:  [95 %-100 %] 99 %  Body mass index is 31 08 kg/m²  Input and Output Summary (last 24 hours): Intake/Output Summary (Last 24 hours) at 2021 0852  Last data filed at 2021 1135  Gross per 24 hour   Intake    Output 200 ml   Net -200 ml       Physical Exam:     Physical Exam  Vitals signs and nursing note reviewed  Exam conducted with a chaperone present  Constitutional:       Comments: Patient seen lying in bed  Uncomfortable appearing   Cardiovascular:      Rate and Rhythm: Normal rate and regular rhythm  Pulmonary:      Effort: Pulmonary effort is normal  No respiratory distress  Breath sounds: Normal breath sounds  Abdominal:      General: Bowel sounds are normal       Tenderness: There is abdominal tenderness (right sided)  Musculoskeletal:      Right lower leg: No edema  Left lower leg: No edema     Skin: General: Skin is warm  Neurological:      Mental Status: He is alert and oriented to person, place, and time  Psychiatric:         Mood and Affect: Mood normal          Behavior: Behavior normal            Additional Data:     Labs:    Results from last 7 days   Lab Units 01/05/21  0525 01/04/21  1306   WBC Thousand/uL 8 28 7 97   HEMOGLOBIN g/dL 12 3 12 7   HEMATOCRIT % 38 3 39 7   PLATELETS Thousands/uL 253 308   NEUTROS PCT %  --  40*   LYMPHS PCT %  --  47*   MONOS PCT %  --  9   EOS PCT %  --  4     Results from last 7 days   Lab Units 01/05/21  0525   SODIUM mmol/L 136   POTASSIUM mmol/L 4 0   CHLORIDE mmol/L 106   CO2 mmol/L 25   BUN mg/dL 13   CREATININE mg/dL 1 05   ANION GAP mmol/L 5   CALCIUM mg/dL 8 3   GLUCOSE RANDOM mg/dL 86                           * I Have Reviewed All Lab Data Listed Above  * Additional Pertinent Lab Tests Reviewed:  All Labs Within Last 24 Hours Reviewed    Imaging:    Imaging Reports Reviewed Today Include: CTs as above  Imaging Personally Reviewed by Myself Includes:  none    Recent Cultures (last 7 days):     Results from last 7 days   Lab Units 01/04/21  1308   URINE CULTURE  No Growth <1000 cfu/mL       Last 24 Hours Medication List:   Current Facility-Administered Medications   Medication Dose Route Frequency Provider Last Rate    acetaminophen  650 mg Oral Q6H PRN Renae Jean Baptiste PA-C      albuterol  2 puff Inhalation Q4H PRN Renae Jean Baptiste PA-C      belladonna-opium  30 mg Rectal Q8H PRN Renae Jean Baptiste PA-C      ketorolac  30 mg Intravenous Q6H [address] Renae Jean Baptiste PA-C      morphine injection  4 mg Intravenous Q4H PRN Renae Jean Baptiste PA-C      nicotine  1 patch Transdermal Daily Renae Jean Baptiste PA-C      ondansetron  4 mg Intravenous Q6H PRN Renea Jean Baptiste PA-C      oxybutynin  5 mg Oral 4x Daily Clemetine Kasal, CRNP      phenazopyridine  200 mg Oral TID With Meals Clemetine Kasal, CRNP      sodium chloride  125 mL/hr Intravenous Continuous Renae Jean Baptiste PA-C 125 mL/hr (01/05/21 1349)    tamsulosin  0 4 mg Oral Daily With Dinner Renae Jean Baptiste PA-C          Today, Patient Was Seen By: Gladys Bejarano PA-C    ** Please Note: Dictation voice to text software may have been used in the creation of this document   **

## 2021-01-06 NOTE — OP NOTE
OPERATIVE REPORT  PATIENT NAME: Tasia Parikh    :  1985  MRN: [id number]  Pt Location: AN OR ROOM 02    SURGERY DATE: 2021    Surgeon(s) and Role:     * Abigail Gallegos MD - Primary    Preop Diagnosis:  Pain due to ureteral stent, initial encounter ([facility] ) Sunitha Fierro    Post-Op Diagnosis Codes:     * Pain due to ureteral stent, initial encounter ([facility] ) [T83 [id number]]    Procedure(s) (LRB):  Cystoscopy RIGHT stent removal (Right)    Specimen(s):  * No specimens in log *    Estimated Blood Loss:   Minimal    Drains:  Ureteral Drain/Stent Right ureter 6 Fr  (Active)   Number of days: 35       Anesthesia Type:   Choice    Operative Indications:  Pain due to ureteral stent, initial encounter [facility]) Sunitha Fierro  The patient was seen in the holding unit prior to the procedure  I reviewed his old records including the operative report of his prior procedure  His procedure was performed for a 5 mm distal ureteral stone  He has had a stent in place since   Urine culture is negative  Most recent CT scan does not reveal any evidence of retained stone  I recommended to the patient that we simply remove his stent  Consent form was signed  Risks were discussed including risk of continued discomfort  Operative Findings:  Normal urethra with short partially obstructing prostate  There was edema about the right ureteral orifice  The remainder the bladder was unremarkable  The stent was grasped and easily removed  Complications:   None    Procedure and Technique:  The patient was brought to the operating room properly identified  General anesthesia was administered  He was placed in lithotomy position prepped draped in usual sterile fashion  Compression boots were employed  Intravenous antibiotic was administered  An appropriate time-out was performed  2% xylocaine jelly was administered to the urethra  Cystoscopy was performed with the 22 [place] cystoscope with findings as above  The stent was grasped with a grasper and removed  The bladder was then drained  Patient tolerated procedure well  He was awakened from anesthesia and taken to the recovery room in satisfactory condition     I was present for the entire procedure    Patient Disposition:  PACU     SIGNATURE: Alejandra Burrows MD  DATE: January 6, 2021  TIME: 5:16 PM

## 2021-01-06 NOTE — TELEPHONE ENCOUNTER
Patient underwent stent removal today  It looks like he has an appointment next week  This can be canceled and he can have a follow-up in approximately 8 weeks as long as he is doing well  I think he should have a renal ultrasound prior to his follow-up visit  The order has been placed in epic

## 2021-01-06 NOTE — ASSESSMENT & PLAN NOTE
· Per record review, patient was admitted to [facility] at the [facility] December and was noted to have right-sided nephrolithiasis with a 3mm obstructing stone in the distal right ureter with mild right hydroureteronephrosis proximal to this  Medical therapy was attempted but he was unable to pass the stone  He underwent cystoscopy, retrograde pyelogram, right ureteroscopy, right laser lithotripsy and right ureteral stent placement on 12/2/2020  He was discharged that same day  He had ED visits on 12/15/2020 and 12/28/2020 for flank pain  He had outpatient follow up on 12/31/2020 with Urology  On 1/4/21 he presented back to the ED with right lower quadrant abdominal pain and had been straining to urinate to the point where he thrombosed a hemorrhoid  CT this admission revealed right nephroureteral stent in place with no definite calculi seen  · Urology following, I discussed with them today via TT  Appreciate their ongoing recommendations   Patient was made NPO by Urology  · Currently on Flomax, Ditropan, Pyridium, and B/O suppositories

## 2021-01-06 NOTE — INTERVAL H&P NOTE
H&P reviewed  After examining the patient I find no changes in the patients condition since the H&P had been written  Vitals:    01/06/21 1523   BP: 132/84   Pulse: 85   Resp: 18   Temp: 98 1 °F (36 7 °C)   SpO2: 99%   Options discussed  Will proceed with cysto stent removal  Procedure described  Risks discussed and consent form signed  Laterality marked - right

## 2021-01-06 NOTE — PROGRESS NOTES
Progress Note - Aldhuhuntiqualek Ashton 38 y o  male MRN: [id number]    Unit/Bed#: OR [place] Encounter: [id number]      Assessment:  Eleonora Prakash is a 24-year-old male with recent right obstructing ureteral calculus, status post cystoscopy, ureteroscopy, laser lithotripsy and insertion of right ureteral stent by Dr Gin Weinberg at [facility] approximately 1 month ago  Patient has developed since significant severe stent colic  Unfortunately, patient was unable to establish office follow-up with Dr Nate Amor group  Furthermore, his care is complicated by lack of insurance  He is made his 4th hospital visit in the past several weeks for persistent right flank pain, dysuria, gross hematuria, strength urea, nausea/vomiting and UTI  He was treated with full course of antibiotics prior to this visit  He sustained thrombosed hemorrhoid secondary to persistent straining as a response to relentless bladder spasm and irritative lower urinary tract symptoms for weeks now  He was seen by Colorectal surgery yesterday for I&D of thrombosed external hemorrhoid  CT of the abdomen and pelvis demonstrated favorable position of pre-existing right ureteral stent with appropriate drainage  Unable to detect any residual stone  There are no suspicious masses or lesions  He achieved only mild relief overnight after admission of flank pain and colic symptoms  Otherwise, patient remains afebrile and hemodynamically stable  Patient has received aggressive IV fluids, antispasmodics, anti-inflammatories, bowel regimen and narcotics in an attempt to alleviate his pain  Plan:  Keep NPO  Patient is now added onto the OR schedule for later this afternoon for cystoscopy, retrograde pyelography and possible ureteral stent removal if no intraoperative finding of obstruction or residual ureteral calculi  Patient is both grateful and agreeable to proceed with intervention  Formal consent by surgeon        Subjective:   I do not feel much better    Denies fever, chills  Endorses significant irritative lower urinary tract symptoms and right flank pain; as well as, nausea  Objective:     Vitals: Blood pressure 132/84, pulse 85, temperature 98 1 °F (36 7 °C), temperature source Temporal, resp  rate 18, height 5' 7" (1 702 m), weight 90 kg (198 lb 6 6 oz), SpO2 99 %  ,Body mass index is 31 08 kg/m²  No intake or output data in the 24 hours ending 01/06/21 1526    Physical Exam: General appearance: alert and oriented, in no acute distress, appears stated age, cooperative and no distress  Head: Normocephalic, without obvious abnormality, atraumatic  Neck: no adenopathy, no carotid bruit, no JVD, supple, symmetrical, trachea midline and thyroid not enlarged, symmetric, no tenderness/mass/nodules  Lungs: clear to auscultation bilaterally  Heart: regular rate and rhythm, S1, S2 normal, no murmur, click, rub or gallop  Abdomen: abnormal findings:  moderate tenderness in the RLQ, in the lower abdomen and in the right flank  Extremities: extremities normal, warm and well-perfused; no cyanosis, clubbing, or edema  Pulses: 2+ and symmetric  Neurologic: Grossly normal  No urinary drains     Invasive Devices     Peripheral Intravenous Line            Peripheral IV 01/06/21 Left;Ventral (anterior) Forearm less than 1 day          Drain            Ureteral Drain/Stent Right ureter 6 Fr  35 days              Lab Results   Component Value Date    WBC 8 28 01/05/2021    HGB 12 3 01/05/2021    HCT 38 3 01/05/2021    MCV 93 01/05/2021     01/05/2021     Lab Results   Component Value Date    SODIUM 136 01/05/2021    K 4 0 01/05/2021     01/05/2021    CO2 25 01/05/2021    BUN 13 01/05/2021    CREATININE 1 05 01/05/2021    GLUC 86 01/05/2021    CALCIUM 8 3 01/05/2021       Lab, Imaging and other studies: I have personally reviewed pertinent reports

## 2021-01-06 NOTE — ANESTHESIA PREPROCEDURE EVALUATION
Procedure:  CYSTOSCOPY RETROGRADE PYELOGRAM WITH INSERTION STENT URETERAL--possible stent removal versus exchange (Right Bladder)    Relevant Problems   ANESTHESIA  High anesthetic requirement with recent procedure      CARDIO (within normal limits)      PULMONARY   (+) Asthma (severe asthma hx; intubated multiple times, most recent 5 years ago)   (+) Smoking      Other   (+) Class 1 obesity in adult (BMI 31)   (+) Colicky RLQ abdominal pain 2/2 ureteral stent (severe stent colic x 1 - for cysto/pyelogram, likely stent removal today)      Physical Exam    Airway    Mallampati score: I  TM Distance: >3 FB  Neck ROM: full     Dental   No notable dental hx     Cardiovascular      Pulmonary  Wheezes (bilateral),     Other Findings       Lab Results   Component Value Date    WBC 8 28 01/05/2021    HGB 12 3 01/05/2021     01/05/2021     Lab Results   Component Value Date    SODIUM 136 01/05/2021    K 4 0 01/05/2021    BUN 13 01/05/2021    CREATININE 1 05 01/05/2021    EGFR 105 01/05/2021     Anesthesia Plan  ASA Score- 3     Anesthesia Type- general with ASA Monitors  Additional Monitors:   Airway Plan: LMA  Comment: High anesthetic requirement  Plan Factors-    Chart reviewed  Existing labs reviewed  Patient summary reviewed  Patient is a current smoker  Patient did not smoke on day of surgery  Induction- intravenous  Postoperative Plan-     Informed Consent- Anesthetic plan and risks discussed with patient  I personally reviewed this patient with the CRNA  Discussed and agreed on the Anesthesia Plan with the CRNA  Payton Mcgarry

## 2021-01-06 NOTE — UTILIZATION REVIEW
Continued Stay Review    Date: 1/6/21                        Current Patient Class: OBS  Current Level of Care: MS    HPI:35 y o  male with hx of R kidney stone s/p lithotripsy and stent placement 12/2/2020   initially admitted on 1/4/21 as OBS thru the ED with colicky RLQ abdom pain 2/2 ureteral stent and thrombosed external hemorrhoid 2/2 straining with voiding  Hemorrhoid lanced in ED with sitz bath ordered along with IVF, pain control and urology consult  Urology plans for aggressive IVF, aggressive pain control, flomax, ditropan, pyridium, B and O suppos, antiinflammatories, Aqua k pad, IV narcotics with wean to po narcotics  Assessment/Plan:   1/6  Pt no longer straining to urinate, c/o R side pain radiating to groin and c/o nausea  Pt states he had hematuria 1/5  Pt made NPO by urology  Pt remains on IVF  Pt placed on OR schedule for this afternoon for cysto, retro pyelogram possible stent removal vs exchange    Pertinent Labs/Diagnostic Results:       Results from last 7 days   Lab Units 01/05/21  0525 01/04/21  1306   WBC Thousand/uL 8 28 7 97   HEMOGLOBIN g/dL 12 3 12 7   HEMATOCRIT % 38 3 39 7   PLATELETS Thousands/uL 253 308   NEUTROS ABS Thousands/µL  --  3 22         Results from last 7 days   Lab Units 01/05/21  0525 01/04/21  1306   SODIUM mmol/L 136 136   POTASSIUM mmol/L 4 0 4 1   CHLORIDE mmol/L 106 103   CO2 mmol/L 25 26   ANION GAP mmol/L 5 7   BUN mg/dL 13 16   CREATININE mg/dL 1 05 1 03   EGFR ml/min/1 73sq m 105 108   CALCIUM mg/dL 8 3 8 8             Results from last 7 days   Lab Units 01/05/21  0525 01/04/21  1306   GLUCOSE RANDOM mg/dL 86 95           Results from last 7 days   Lab Units 01/04/21  1308 12/31/20  1126   CLARITY UA  Slightly Cloudy cloudy   COLOR UA  Yellow yellow   SPEC GRAV UA  >=1 030  --    PH UA  6 0  --    GLUCOSE UA mg/dl Negative neg   KETONES UA mg/dl Negative neg   BLOOD UA  Small* +++   PROTEIN UA mg/dl 30 (1+)* ++   NITRITE UA  Negative neg   BILIRUBIN UA Negative  --    BILIRUBIN UA POC   --  neg   UROBILINOGEN UA E U /dl 0 2 neg   LEUKOCYTES UA  Small* ++   WBC UA /hpf 10-20*  --    RBC UA /hpf 1-2  --    BACTERIA UA /hpf Occasional  --    EPITHELIAL CELLS WET PREP /hpf None Seen  --                Results from last 7 days   Lab Units 01/04/21  1308   URINE CULTURE  No Growth <1000 cfu/mL               Vital Signs:   Date/Time  Temp  Pulse  Resp  BP  MAP (mmHg)  SpO2    01/06/21 07:02:59  97 7 °F (36 5 °C)  59  15  124/80  95  99 %    01/05/21 21:58:05  98 6 °F (37 °C)  76  18  118/72  87  96 %    01/05/21 21:57:34  98 6 °F (37 °C)  68  18  118/72  87  96 %    01/05/21 19:18:48  99 7 °F (37 6 °C)  85  18  137/81  100  98 %    01/05/21 1825  99 6 °F (37 6 °C)  78  18  136/84    100 %    01/05/21 1527    92  16  138/65    95 %    01/05/21 1348    93  16  138/74    98 %    01/05/21 1133    68  16  128/79    100 %    01/05/21 0753  98 7 °F (37 1 °C)  72  16  114/67    100 %          Medications:   Scheduled Medications:  ketorolac, 30 mg, Intravenous, Q6H VERÓNICA  nicotine, 1 patch, Transdermal, Daily  oxybutynin, 5 mg, Oral, 4x Daily  phenazopyridine, 200 mg, Oral, TID With Meals  tamsulosin, 0 4 mg, Oral, Daily With Dinner  morphine injection 2 mg   Dose: 2 mg  Freq: Once Route: IV x1 1/5    Continuous IV Infusions:  sodium chloride, 125 mL/hr, Intravenous, Continuous      PRN Meds:  acetaminophen, 650 mg, Oral, Q6H PRN  albuterol, 2 puff, Inhalation, Q4H PRN  belladonna-opium, 30 mg, Rectal, Q8H PRN  morphine injection, 4 mg, Intravenous, Q4H PRN x3 1/5, x1 1/6  ondansetron, 4 mg, Intravenous, Q6H PRN x1 1/6  oxyCODONE, 5 mg, Oral, Q4H PRN x1 1/6        Discharge Plan:TBD    Network Utilization Review Department  ATTENTION: Please call with any questions or concerns to [phone] and carefully listen to the prompts so that you are directed to the right person   All voicemails are confidential   Lawrence Jaramillo all requests for admission clinical reviews, approved or denied determinations and any other requests to dedicated fax number below belonging to the campus where the patient is receiving treatment   List of dedicated fax numbers for the Facilities:  [address] [place] [address] DENIALS (Administrative/Medical Necessity) [phone]   [address] (Maternity/NICU/Pediatrics) [phone] [address] [place] [address] Dr Yesi Blanton 5727 (  [facility] [address]) [address] [place] [address] [address] Sathya Caldera [address] [place]) [address] [place], [place] [address] [phone]

## 2021-01-06 NOTE — ANESTHESIA POSTPROCEDURE EVALUATION
Post-Op Assessment Note    CV Status:  Stable  Pain Score: 0    Pain management: adequate     Mental Status:  Alert and awake   Hydration Status:  Euvolemic   PONV Controlled:  Controlled   Airway Patency:  Patent      Post Op Vitals Reviewed: Yes      Staff: CRNA         No complications documented      /85 (01/06/21 1724)    Temp 97 7 °F (36 5 °C) (01/06/21 1724)    Pulse 77 (01/06/21 1724)   Resp 19 (01/06/21 1724)    SpO2 98 % (01/06/21 1724)

## 2021-01-07 VITALS
SYSTOLIC BLOOD PRESSURE: 125 MMHG | HEART RATE: 55 BPM | BODY MASS INDEX: 31.14 KG/M2 | WEIGHT: 198.41 LBS | TEMPERATURE: 97.7 F | DIASTOLIC BLOOD PRESSURE: 62 MMHG | OXYGEN SATURATION: 95 % | RESPIRATION RATE: 19 BRPM | HEIGHT: 67 IN

## 2021-01-07 PROCEDURE — 99217 PR OBSERVATION CARE DISCHARGE MANAGEMENT: CPT | Performed by: PHYSICIAN ASSISTANT

## 2021-01-07 RX ORDER — OXYBUTYNIN CHLORIDE 5 MG/1
5 TABLET ORAL 4 TIMES DAILY
Refills: 0
Start: 2021-01-07

## 2021-01-07 RX ORDER — OXYCODONE HYDROCHLORIDE 10 MG/1
10 TABLET ORAL EVERY 4 HOURS PRN
Status: DISCONTINUED | OUTPATIENT
Start: 2021-01-07 | End: 2021-01-07 | Stop reason: HOSPADM

## 2021-01-07 RX ORDER — DOCUSATE SODIUM 100 MG/1
100 CAPSULE, LIQUID FILLED ORAL 2 TIMES DAILY PRN
Status: DISCONTINUED | OUTPATIENT
Start: 2021-01-07 | End: 2021-01-07 | Stop reason: HOSPADM

## 2021-01-07 RX ORDER — LIDOCAINE HYDROCHLORIDE 20 MG/ML
JELLY TOPICAL 3 TIMES DAILY
Qty: 30 ML | Refills: 0 | Status: SHIPPED | OUTPATIENT
Start: 2021-01-07

## 2021-01-07 RX ADMIN — SODIUM CHLORIDE 125 ML/HR: 0.9 INJECTION, SOLUTION INTRAVENOUS at 02:18

## 2021-01-07 RX ADMIN — KETOROLAC TROMETHAMINE 30 MG: 30 INJECTION, SOLUTION INTRAMUSCULAR at 10:05

## 2021-01-07 RX ADMIN — OXYCODONE HYDROCHLORIDE 10 MG: 10 TABLET ORAL at 08:23

## 2021-01-07 RX ADMIN — OXYCODONE HYDROCHLORIDE 5 MG: 5 TABLET ORAL at 02:29

## 2021-01-07 RX ADMIN — MORPHINE SULFATE 4 MG: 4 INJECTION INTRAVENOUS at 11:33

## 2021-01-07 RX ADMIN — PHENAZOPYRIDINE 200 MG: 100 TABLET ORAL at 08:06

## 2021-01-07 RX ADMIN — Medication 1 PATCH: at 08:08

## 2021-01-07 RX ADMIN — OXYBUTYNIN CHLORIDE 5 MG: 5 TABLET ORAL at 08:06

## 2021-01-07 RX ADMIN — KETOROLAC TROMETHAMINE 30 MG: 30 INJECTION, SOLUTION INTRAMUSCULAR at 05:23

## 2021-01-07 NOTE — ASSESSMENT & PLAN NOTE
· Per record review, [facility] and was noted to have right-sided nephrolithiasis with a 3mm obstructing stone in the distal right ureter with mild right hydroureteronephrosis proximal to this  Medical therapy was attempted but he was unable to pass the stone  He underwent cystoscopy, retrograde pyelogram, right ureteroscopy, right laser lithotripsy and right ureteral stent placement on 12/2/2020  He was discharged that same day  He had ED visits on 12/15/2020 and 12/28/2020 for flank pain  He had outpatient follow up on 12/31/2020 with Urology  On 1/4/21 he presented back to the ED with right lower quadrant abdominal pain and had been straining to urinate to the point where he thrombosed a hemorrhoid  CT this admission revealed right nephroureteral stent in place with no definite calculi seen     · Urology input appreciated--patient s/p stent removal on 1/6  · Currently on Flomax, Ditropan, Pyridium, and oxycodone

## 2021-01-07 NOTE — DISCHARGE INSTR - AVS FIRST PAGE
You can continue to do warm Sitz baths and utilize topical lidocaine on your hemorrhoids    Call Colorectal surgery to schedule outpatient hemorrhoid surgery

## 2021-01-07 NOTE — DISCHARGE SUMMARY
Discharge- Jolynn Cottrell 1985, 38 y o  male MRN: [id number]    Unit/Bed#: W -01 Encounter: [id number]    Primary Care Provider: No primary care provider on file  Date and time admitted to hospital: 1/4/2021 60:80 PM    * Colicky RLQ abdominal pain 2/2 ureteral stent  Assessment & Plan  · Per record review, patient was admitted to [facility] December and was noted to have right-sided nephrolithiasis with a 3mm obstructing stone in the distal right ureter with mild right hydroureteronephrosis proximal to this  Medical therapy was attempted but he was unable to pass the stone  He underwent cystoscopy, retrograde pyelogram, right ureteroscopy, right laser lithotripsy and right ureteral stent placement on 12/2/2020  He was discharged that same day  He had ED visits on 12/15/2020 and 12/28/2020 for flank pain  He had outpatient follow up on 12/31/2020 with Urology  On 1/4/21 he presented back to the ED with right lower quadrant abdominal pain and had been straining to urinate to the point where he thrombosed a hemorrhoid  CT this admission revealed right nephroureteral stent in place with no definite calculi seen  · Urology input appreciated--patient s/p stent removal on 1/6  · Currently on Flomax, Ditropan, Pyridium, and oxycodone    Thrombosed external hemorrhoid  Assessment & Plan  · 2/2 straining w/ urination  · S/p agata in the ED by CRS  · Pain control, sitz baths  Follow up with Colorectal surgery    Class 1 obesity in adult  Assessment & Plan  Body mass index is 31 08 kg/m²  · Lifestyle modification     Asthma  Assessment & Plan  · Albuterol on board PRN      Discharging Physician / Practitioner: Francis Loya PA-C  PCP: No primary care provider on file    Admission Date:   Admission Orders (From admission, onward)     Ordered        01/04/21 2037  Place in Observation  Once                   Discharge Date: 01/07/21    Resolved Problems  Date Reviewed: 1/7/2021 None          Consultations During Hospital Stay:  · Urology    Procedures Performed:   · Stent removal    Significant Findings / Test Results:   · None    Incidental Findings:   · None     Test Results Pending at Discharge (will require follow up): · None     Outpatient Tests Requested:  · Ultrasound    Complications:  None    Reason for Admission:  Chronic flank pain    Hospital Course:     Kerry Messina is a 38 y o  male patient with recent right obstructing ureteral calculus status post cystoscopy lithotripsy and right-sided stent placement who originally presented to the hospital on 1/4/2021 due to severe stent colic and pain  He was seen by Urology and provided pain control measures and underwent removal of stent on January 6  Due to straining to urinate patient also thrombosed hemorrhoid which had to be lanced by Colorectal surgery  He was recommended to take stool softeners, and can utilize topical lidocaine for hemorrhoidal pain and use Sitz baths until a time which he can follow-up as an outpatient for hemorrhoid surgery with Colorectal   In conversation with urology no additional inpatient plans are anticipated at this time    Please see above list of diagnoses and related plan for additional information  Condition at Discharge: stable     Discharge Day Visit / Exam:     Subjective:  Patient still reporting pain and states that 5 mg of oxycodone does nothing  He is eating and drinking without any events of vomiting, tolerating oral medication, and has been out of bed and ambulating  He also requested to see Colorectal surgery again stating that his "prolapsed bowel returned" and he is afraid to pee    Vitals: Blood Pressure: 125/62 (01/07/21 0629)  Pulse: 55 (01/07/21 0629)  Temperature: 97 7 °F (36 5 °C) (01/07/21 0629)  Temp Source: Oral (01/06/21 1840)  Respirations: 19 (01/07/21 0629)  Height: 5' 7" (170 2 cm) (01/05/21 2016)  Weight - Scale: 90 kg (198 lb 6 6 oz) (01/05/21 2016)  SpO2: 95 % (01/07/21 0629)  Exam:   Physical Exam  Vitals signs reviewed  Constitutional:       General: He is not in acute distress  Appearance: He is obese  He is not ill-appearing, toxic-appearing or diaphoretic  Cardiovascular:      Rate and Rhythm: Normal rate and regular rhythm  Heart sounds: No murmur  Pulmonary:      Effort: No respiratory distress  Breath sounds: No stridor  No wheezing, rhonchi or rales  Abdominal:      Tenderness: There is no guarding  Skin:     General: Skin is warm and dry  Coloration: Skin is not jaundiced or pale  Findings: No bruising, erythema, lesion or rash  Neurological:      General: No focal deficit present  Mental Status: He is alert  Discussion with Family:  Offered but patient declined    Discharge instructions/Information to patient and family:   See after visit summary for information provided to patient and family  Provisions for Follow-Up Care:  See after visit summary for information related to follow-up care and any pertinent home health orders  Disposition:  Home    Planned Readmission: none     Discharge Statement:  I spent 35 minutes discharging the patient  This time was spent on the day of discharge  I had direct contact with the patient on the day of discharge  Greater than 50% of the total time was spent examining patient, answering all patient questions, arranging and discussing plan of care with patient as well as directly providing post-discharge instructions  Additional time then spent on discharge activities  Spoke with nursing and Colorectal surgery and Urology  Discharge Medications:  See after visit summary for reconciled discharge medications provided to patient and family        ** Please Note: This note has been constructed using a voice recognition system **

## 2021-01-14 NOTE — TELEPHONE ENCOUNTER
Called and spoke with patient regarding billing note  Advised that patient was signed up for [place] [employer] and that he would have to follow-up in [place]  Patient states that he was very dissatisfied with the care he received from Dr Samira Beyer office  Patient states that ideally he would like to follow-up with our practice, but understands the insurance restrictions  I let him know that I could speak with some providers in our office and see if they can recommend another provider in [place]  Patient states that Clarisse Sanchez was excellent when rounding, and was his biggest cheerleader  Patient is requesting a phone call from Clarisse Sanchez or any recommendations of a location/provider in [place] that he can follow-up with

## 2021-02-01 NOTE — UTILIZATION REVIEW
Notification of Observation Admission/Observation Authorization Request   This is a Notification of Observation Admission for Kieran  Be advised that this patient was admitted to our facility under Observation Status  Contact Carmela Coulter at [phone] for additional admission information  [facility] DEPT  DEDICATED [phone]  Patient Name:   Shashank Kearns   YOB: 1985       [address]   [address]:   [address]  Tax ID: [id number]  NPI: [id number] Attending Provider/NPI: Sparkle Moncada [[id number]]   Place of Service Code: 25     Place of Service Name:  CPT Code for Observation:  On [address] / CPT [address]   Start Date:      Discharge Date & Time: 1/7/2021 12:50 PM    Type of Admission: Observation Status Discharge Disposition   (if discharged): Home/Self Care   Patient Diagnoses: Rectal pain [K62 89]  Drug-induced constipation [K59 03]  External hemorrhoid, thrombosed [K64 5]  Pain due to ureteral stent, initial encounter [facility]) Onel Foster     Orders: Admission Orders (From admission, onward)     Ordered        01/04/21 2037  Place in Observation  Once                    Assigned Utilization Review Contact: Carmela Coulter  Utilization   Network Utilization Review Department  Phone: [phone]; Fax [phone]  Email: [email or web address]   ATTENTION PAYERS: Please call the assigned Utilization  directly with any questions or concerns ALL voicemails in the department are confidential  Send all requests for admission clinical reviews, approved or denied determinations and any other requests to dedicated fax number belonging to the campus where the patient is receiving treatment        Initial Clinical Review    Admission: Date/Time/Statement:   Admission Orders (From admission, onward)     Ordered        01/04/21 2037 Place in Observation  Once                   Orders Placed This Encounter   Procedures    Place in Observation     Standing Status:   Standing     Number of Occurrences:   1     Order Specific Question:   Admitting Physician     Answer:   Hien Romero [[id number]]     Order Specific Question:   Level of Care     Answer:   Med Surg [16]     ED Arrival Information     Expected Arrival Acuity Means of Arrival Escorted By Service Admission Type    - 1/4/2021 10:58 Urgent Walk-In Spouse General Medicine Urgent    Arrival Complaint    Hemmorhoid, Blood in Urine        Chief Complaint   Patient presents with    Rectal Pain     patient reports having prolapsed rectum last night  + blood in urine after stent placed on 12/2  told by urology to come if still having complications    Blood in Urine     Assessment/Plan: 39 yo male with hx of R kidney stone s/p lithotripsy and stent placement 12/2/2020 presents to ED from home with RLQ abdom pain, hematuria, straining to urinate, decreased UOP  Overnight, patient strained with urinating to the point where he thrombosed hemorrhoid  On exam, pt has RLQ / R groin tenderness, with slight end expiratory wheezing  Pt given IVF, IV ativan, IV analgesic in ED ,po Flomax , pyridium and Ditropan in ED  Pt continue with pain 7/10  CT A/P shows R stent with no signs of stone  External hemorrhoid, large lateral position with inability to tolerate digital exam lanced in ED by Colorectal surgery as consult  F/u in 1 month as outpt with colorectal      Pt admitted as OBS with colicky RLQ abdom pain 2/2 ureteral stent, thrombosed external hemorrhoid 2/2 straining with voiding  Plan is for pain control, sitz baths, continue IVF, NPO, urology consult  1/5  Urology developing plan for continued pain with possible d/c 1/5 or 1/6  Pt continues to have pain r flank, encouraged to ambulate       ED Triage Vitals   Temperature Pulse Respirations Blood Pressure SpO2   01/04/21 1112 01/04/21 1112 01/04/21 1112 01/04/21 1112 01/04/21 1112   97 9 °F (36 6 °C) 96 18 154/81 100 %      Temp Source Heart Rate Source Patient Position - Orthostatic VS BP Location FiO2 (%)   01/04/21 1112 01/04/21 1112 01/04/21 1112 01/04/21 1112 --   Oral Monitor Lying Right arm       Pain Score       01/04/21 1526       Worst Possible Pain          Wt Readings from Last 1 Encounters:   01/05/21 90 kg (198 lb 6 6 oz)     Additional Vital Signs:   Date/Time  Temp  Pulse  Resp  BP  MAP (mmHg)  SpO2    01/05/21 0753  98 7 °F (37 1 °C)  72  16  114/67    100 %    01/05/21 0507    60  16  112/58    100 %    01/04/21 2145    78  16  118/64  85  95 %    01/04/21 2134    83  16  118/64  85  96 %        Pertinent Labs/Diagnostic Test Results:   1/4 CT renal stone study A/P  1  Right nephroureteral stent in place    No definite calculi seen along the course of the stent or within the right kidney    2   Unremarkable left kidney                                                   ED Treatment:   Medication Administration from 01/04/2021 1058 to 01/05/2021 0815       Date/Time Order Dose Route Action     01/04/2021 1318 morphine (PF) 4 mg/mL injection 4 mg 4 mg Intravenous Given     01/04/2021 1318 LORazepam (ATIVAN) injection 0 5 mg 0 5 mg Intravenous Given     01/04/2021 1433 morphine (PF) 4 mg/mL injection 4 mg 4 mg Intravenous Given     01/04/2021 1529 lidocaine (PF) (XYLOCAINE-MPF) 1 % injection 10 mL 10 mL Infiltration Given     01/04/2021 1656 ketorolac (TORADOL) injection 15 mg 15 mg Intravenous Given     01/04/2021 1656 phenazopyridine (PYRIDIUM) tablet 100 mg 100 mg Oral Given     01/04/2021 1656 tamsulosin (FLOMAX) capsule 0 4 mg 0 4 mg Oral Given     01/04/2021 1656 docusate sodium (COLACE) capsule 100 mg 100 mg Oral Given     01/04/2021 1526 HYDROmorphone (DILAUDID) injection 1 mg 1 mg Intravenous Given     01/04/2021 1841 oxybutynin (DITROPAN) tablet 5 mg 5 mg Oral Given     01/04/2021 1700 sodium chloride 0 9 % bolus 1,000 mL 1,000 mL Intravenous New Bag     01/04/2021 1700 sodium chloride 0 9 % bolus 1,000 mL 1,000 mL Intravenous New Bag     01/04/2021 2005 morphine (PF) 4 mg/mL injection 4 mg 4 mg Intravenous Given     01/04/2021 2134 oxybutynin (DITROPAN) tablet 5 mg 5 mg Oral Given     01/04/2021 2134 sodium chloride 0 9 % infusion 125 mL/hr Intravenous New Bag     01/05/2021 0104 ketorolac (TORADOL) injection 30 mg 30 mg Intravenous Given     01/05/2021 0420 morphine (PF) 4 mg/mL injection 4 mg 4 mg Intravenous Given        Past Medical History:   Diagnosis Date    Asthma          Admitting Diagnosis: Rectal pain [K62 89]  Drug-induced constipation [K59 03]  External hemorrhoid, thrombosed [K64 5]  Pain due to ureteral stent, initial encounter ([facility] ) Rachel Flatter  Age/Sex: 38 y o  male  Admission Orders:  Scheduled Medications:  No current facility-administered medications for this encounter  Continuous IV Infusions:  No current facility-administered medications for this encounter  PRN Meds:  acetaminophen, 650 mg, Oral, Q6H PRN  albuterol, 2 puff, Inhalation, Q4H PRN  belladonna-opium, 30 mg, Rectal, Q8H PRN  morphine injection, 4 mg, Intravenous, Q4H PRN x1 1/5  ondansetron, 4 mg, Intravenous, Q6H PRN      OOB as ana  NPO    IP CONSULT TO COLORECTAL SURGERY  IP CONSULT TO UROLOGY    Network Utilization Review Department  ATTENTION: Please call with any questions or concerns to [phone] and carefully listen to the prompts so that you are directed to the right person  All voicemails are confidential   Blank Alfaro all requests for admission clinical reviews, approved or denied determinations and any other requests to dedicated fax number below belonging to the campus where the patient is receiving treatment   List of dedicated fax numbers for the Facilities:  [address] DENIALS (Administrative/Medical Necessity) [phone]   [address] (Maternity/NICU/Pediatrics) [phone] 5000 [facility] [phone]   [address] [phone]   Dari Blanton [address] ([address]) [address] [place] [address] Leonard Caldera [facility] [phone]   [place] [address] [phone]

## 2021-02-03 NOTE — TELEPHONE ENCOUNTER
Patient called in stating he went to a ED in [address] [place] [address] Monday because of unable to urinate and the hospital placed a catheter and now patient is having issues with catheter pusing  Patient stated he did contact the hospital who placed the catheter but they hung up on him  Patient is asking for any advised on what to do   Patient can be reached at [phone]

## 2021-02-05 NOTE — TELEPHONE ENCOUNTER
Attempted to call patient at this time, no answer  Unable to leave message as it states mailbox is full     When patient returns call please advise provider can recommend Dr Michell Ramirez or Dr Mansi Grossman as they are urologist in [place] as well if patient would like to see someone else other than Dr Atilano Klinefelter   Thank you

## 2021-02-11 NOTE — TELEPHONE ENCOUNTER
Patient has insurance that is limited to [employer]  Patient was feeling a lot better once we spoke after last surgery  He had stated that Arely Bellamy was a great resource to him in the hospital and wanted some provider recommendations in [place]

## 2021-04-11 ENCOUNTER — APPOINTMENT (EMERGENCY)
Dept: RADIOLOGY | Facility: HOSPITAL | Age: 36
End: 2021-04-11
Payer: COMMERCIAL

## 2021-04-11 ENCOUNTER — HOSPITAL ENCOUNTER (EMERGENCY)
Facility: HOSPITAL | Age: 36
Discharge: HOME | End: 2021-04-12
Attending: EMERGENCY MEDICINE | Admitting: EMERGENCY MEDICINE
Payer: COMMERCIAL

## 2021-04-11 DIAGNOSIS — R10.9 RIGHT FLANK PAIN: Primary | ICD-10-CM

## 2021-04-11 DIAGNOSIS — R33.9 URINARY RETENTION WITH INCOMPLETE BLADDER EMPTYING: ICD-10-CM

## 2021-04-11 LAB
BASOPHILS # BLD AUTO: 0.04 THOUSANDS/ΜL (ref 0–0.1)
BASOPHILS NFR BLD AUTO: 0 % (ref 0–1)
BILIRUB UR QL STRIP: NEGATIVE
CLARITY UR: CLEAR
COLOR UR: YELLOW
EOSINOPHIL # BLD AUTO: 0.18 THOUSAND/ΜL (ref 0–0.61)
EOSINOPHIL NFR BLD AUTO: 1 % (ref 0–6)
ERYTHROCYTE [DISTWIDTH] IN BLOOD BY AUTOMATED COUNT: 12 % (ref 11.6–15.1)
GLUCOSE UR STRIP-MCNC: NEGATIVE MG/DL
HCT VFR BLD AUTO: 44 % (ref 36.5–49.3)
HGB BLD-MCNC: 14.4 G/DL (ref 12–17)
HGB UR QL STRIP.AUTO: NEGATIVE
IMM GRANULOCYTES # BLD AUTO: 0.03 THOUSAND/UL (ref 0–0.2)
IMM GRANULOCYTES NFR BLD AUTO: 0 % (ref 0–2)
KETONES UR STRIP-MCNC: ABNORMAL MG/DL
LEUKOCYTE ESTERASE UR QL STRIP: NEGATIVE
LYMPHOCYTES # BLD AUTO: 4.42 THOUSANDS/ΜL (ref 0.6–4.47)
LYMPHOCYTES NFR BLD AUTO: 35 % (ref 14–44)
MCH RBC QN AUTO: 29.8 PG (ref 26.8–34.3)
MCHC RBC AUTO-ENTMCNC: 32.7 G/DL (ref 31.4–37.4)
MCV RBC AUTO: 91 FL (ref 82–98)
MONOCYTES # BLD AUTO: 1.13 THOUSAND/ΜL (ref 0.17–1.22)
MONOCYTES NFR BLD AUTO: 9 % (ref 4–12)
NEUTROPHILS # BLD AUTO: 7.03 THOUSANDS/ΜL (ref 1.85–7.62)
NEUTS SEG NFR BLD AUTO: 55 % (ref 43–75)
NITRITE UR QL STRIP: NEGATIVE
NRBC BLD AUTO-RTO: 0 /100 WBCS
PH UR STRIP.AUTO: 5 [PH]
PLATELET # BLD AUTO: 306 THOUSANDS/UL (ref 149–390)
PMV BLD AUTO: 10.1 FL (ref 8.9–12.7)
PROT UR STRIP-MCNC: NEGATIVE MG/DL
RBC # BLD AUTO: 4.83 MILLION/UL (ref 3.88–5.62)
SP GR UR STRIP.AUTO: >=1.03 (ref 1–1.03)
UROBILINOGEN UR QL STRIP.AUTO: 0.2 E.U./DL
WBC # BLD AUTO: 12.83 THOUSAND/UL (ref 4.31–10.16)

## 2021-04-11 PROCEDURE — 80053 COMPREHEN METABOLIC PANEL: CPT | Performed by: EMERGENCY MEDICINE

## 2021-04-11 PROCEDURE — 96374 THER/PROPH/DIAG INJ IV PUSH: CPT

## 2021-04-11 PROCEDURE — 85025 COMPLETE CBC W/AUTO DIFF WBC: CPT | Performed by: EMERGENCY MEDICINE

## 2021-04-11 PROCEDURE — 96375 TX/PRO/DX INJ NEW DRUG ADDON: CPT

## 2021-04-11 PROCEDURE — 99285 EMERGENCY DEPT VISIT HI MDM: CPT | Performed by: EMERGENCY MEDICINE

## 2021-04-11 PROCEDURE — 74176 CT ABD & PELVIS W/O CONTRAST: CPT

## 2021-04-11 PROCEDURE — 81003 URINALYSIS AUTO W/O SCOPE: CPT | Performed by: EMERGENCY MEDICINE

## 2021-04-11 PROCEDURE — 36415 COLL VENOUS BLD VENIPUNCTURE: CPT | Performed by: EMERGENCY MEDICINE

## 2021-04-11 PROCEDURE — 99284 EMERGENCY DEPT VISIT MOD MDM: CPT

## 2021-04-11 PROCEDURE — 96361 HYDRATE IV INFUSION ADD-ON: CPT

## 2021-04-11 PROCEDURE — G1004 CDSM NDSC: HCPCS

## 2021-04-11 RX ORDER — KETOROLAC TROMETHAMINE 30 MG/ML
15 INJECTION, SOLUTION INTRAMUSCULAR; INTRAVENOUS ONCE
Status: COMPLETED | OUTPATIENT
Start: 2021-04-12 | End: 2021-04-11

## 2021-04-11 RX ORDER — ONDANSETRON 2 MG/ML
4 INJECTION INTRAMUSCULAR; INTRAVENOUS ONCE
Status: COMPLETED | OUTPATIENT
Start: 2021-04-12 | End: 2021-04-11

## 2021-04-11 RX ADMIN — ONDANSETRON 4 MG: 2 INJECTION INTRAMUSCULAR; INTRAVENOUS at 23:49

## 2021-04-11 RX ADMIN — KETOROLAC TROMETHAMINE 15 MG: 30 INJECTION, SOLUTION INTRAMUSCULAR at 23:49

## 2021-04-11 RX ADMIN — SODIUM CHLORIDE 1000 ML: 0.9 INJECTION, SOLUTION INTRAVENOUS at 23:47

## 2021-04-11 NOTE — Clinical Note
Lula Sifuentes was seen and treated in our emergency department on 4/11/2021  Diagnosis:     Carito De La Fuente  may return to work on return date  He may return on this date: 04/13/2021         If you have any questions or concerns, please don't hesitate to call        Ivone Luque, DO    ______________________________           _______________          _______________  Hospital Representative                              Date                                Time

## 2021-04-12 ENCOUNTER — APPOINTMENT (EMERGENCY)
Dept: RADIOLOGY | Facility: HOSPITAL | Age: 36
End: 2021-04-12
Payer: COMMERCIAL

## 2021-04-12 VITALS
DIASTOLIC BLOOD PRESSURE: 74 MMHG | TEMPERATURE: 98.4 F | SYSTOLIC BLOOD PRESSURE: 145 MMHG | HEART RATE: 88 BPM | OXYGEN SATURATION: 100 % | RESPIRATION RATE: 18 BRPM

## 2021-04-12 LAB
ALBUMIN SERPL BCP-MCNC: 4.4 G/DL (ref 3.5–5)
ALP SERPL-CCNC: 84 U/L (ref 46–116)
ALT SERPL W P-5'-P-CCNC: 34 U/L (ref 12–78)
ANION GAP SERPL CALCULATED.3IONS-SCNC: 10 MMOL/L (ref 4–13)
AST SERPL W P-5'-P-CCNC: 20 U/L (ref 5–45)
BILIRUB SERPL-MCNC: 0.45 MG/DL (ref 0.2–1)
BUN SERPL-MCNC: 14 MG/DL (ref 5–25)
CALCIUM SERPL-MCNC: 9.2 MG/DL (ref 8.3–10.1)
CHLORIDE SERPL-SCNC: 103 MMOL/L (ref 100–108)
CO2 SERPL-SCNC: 27 MMOL/L (ref 21–32)
CREAT SERPL-MCNC: 1.21 MG/DL (ref 0.6–1.3)
GFR SERPL CREATININE-BSD FRML MDRD: 89 ML/MIN/1.73SQ M
GLUCOSE SERPL-MCNC: 92 MG/DL (ref 65–140)
POTASSIUM SERPL-SCNC: 3.4 MMOL/L (ref 3.5–5.3)
PROT SERPL-MCNC: 8.1 G/DL (ref 6.4–8.2)
SODIUM SERPL-SCNC: 140 MMOL/L (ref 136–145)

## 2021-04-12 PROCEDURE — 96375 TX/PRO/DX INJ NEW DRUG ADDON: CPT

## 2021-04-12 PROCEDURE — G1004 CDSM NDSC: HCPCS

## 2021-04-12 PROCEDURE — 74177 CT ABD & PELVIS W/CONTRAST: CPT

## 2021-04-12 RX ORDER — MORPHINE SULFATE 4 MG/ML
4 INJECTION, SOLUTION INTRAMUSCULAR; INTRAVENOUS ONCE
Status: COMPLETED | OUTPATIENT
Start: 2021-04-12 | End: 2021-04-12

## 2021-04-12 RX ADMIN — MORPHINE SULFATE 4 MG: 4 INJECTION INTRAVENOUS at 00:54

## 2021-04-12 RX ADMIN — IOHEXOL 100 ML: 350 INJECTION, SOLUTION INTRAVENOUS at 01:28

## 2021-04-12 NOTE — ED NOTES
Pt ferrara changed from standard drainage bag to leg bag  Pt given verbal as well as return demonstration from patient on how to maintain, drain and change urine bag/ferrara  No further questions at this time        Kushal Morales RN  04/12/21 0099

## 2021-04-12 NOTE — ED NOTES
Pt seen, assessed and d/c by provider  Pt appeared to be in no acute distress upon discharge  Pt able to ambulate well without assistance upon exiting        Jesús Lauren RN  04/12/21 6548

## 2021-04-12 NOTE — ED PROVIDER NOTES
History  Chief Complaint   Patient presents with    Flank Pain     Patient c/o right sided flank pain since Friday  Patient c/o N/V  Denies diarrhea  Patient has hx of stones  Patient also c/o urinary frequency and painful urination  Patient last urinated this morning   Nausea    Vomiting     Patient here with complaint of right flank pain that began on Friday  Patient states that he has had decreased urination, and has been "dribbling since"  Pain radiates into his abdomen  Patient states that he has a history of kidney stones, and this pain seems similar  Patient last took ibuprofen for pain at 2:00 p m  Today  Patient also took some oxycodone without relief  He complains of nausea without vomiting  He denies fevers or chills  Patient has a prior medical history of asthma  History provided by:  Patient   used: No    Flank Pain  Pain location:  R flank  Pain quality: aching    Pain radiates to:  Groin  Pain severity:  Moderate  Onset quality:  Sudden  Timing:  Constant  Progression:  Worsening  Relieved by:  Nothing  Worsened by: Movement and urination  Ineffective treatments:  Urination and OTC medications  Associated symptoms: nausea and vomiting    Associated symptoms: no chest pain, no chills, no constipation, no cough, no diarrhea, no dysuria, no fever, no hematuria and no shortness of breath    Nausea  The primary symptoms include nausea and vomiting  Primary symptoms do not include fever, abdominal pain, diarrhea, dysuria or rash  The illness does not include chills, constipation or back pain  Vomiting  Associated symptoms: no abdominal pain, no chills, no cough, no diarrhea and no fever        Prior to Admission Medications   Prescriptions Last Dose Informant Patient Reported? Taking?    albuterol (PROVENTIL HFA,VENTOLIN HFA) 90 mcg/act inhaler  Self Yes No   Sig: Inhale 2 puffs every 4 (four) hours as needed   docusate sodium (COLACE) 100 mg capsule   No No Sig: Take 1 capsule (100 mg total) by mouth every 12 (twelve) hours   lidocaine (XYLOCAINE) 2 % topical gel   No No   Sig: Apply topically 3 (three) times a day   naproxen (NAPROSYN) 500 mg tablet   No No   Sig: Take 1 tablet (500 mg total) by mouth 2 (two) times a day with meals   nicotine (NICODERM CQ) 14 mg/24hr TD 24 hr patch  Self No No   Sig: Place 1 patch on the skin daily   ondansetron (ZOFRAN-ODT) 4 mg disintegrating tablet  Self No Yes   Sig: Take 1 tablet (4 mg total) by mouth every 6 (six) hours as needed for nausea or vomiting for up to 3 days   oxyCODONE (ROXICODONE) 5 mg immediate release tablet  Self No Yes   Sig: Take 1 tablet (5 mg total) by mouth every 4 (four) hours as needed for moderate pain for up to 20 dosesMax Daily Amount: 30 mg   oxybutynin (DITROPAN) 5 mg tablet   No No   Sig: Take 1 tablet (5 mg total) by mouth 4 (four) times a day   phenazopyridine (PYRIDIUM) 200 mg tablet   No No   Sig: Take 1 tablet (200 mg total) by mouth 3 (three) times a day   polyethylene glycol (GLYCOLAX) 17 GM/SCOOP powder   No No   Sig: Take 17 g by mouth daily   tamsulosin (FLOMAX) 0 4 mg   No No   Sig: Take 1 capsule (0 4 mg total) by mouth daily with dinner for 14 days      Facility-Administered Medications: None       Past Medical History:   Diagnosis Date    Asthma        Past Surgical History:   Procedure Laterality Date    HEMORRHOID SURGERY      JOINT REPLACEMENT Right     knee    KNEE SURGERY      AR CYSTO/URETERO W/LITHOTRIPSY &INDWELL STENT INSRT Right 12/2/2020    Procedure: CYSTOSCOPY URETEROSCOPY WITH LITHOTRIPSY HOLMIUM LASER, RETROGRADE PYELOGRAM, STONE BASKET RETRIEVAL, URETHRAL DILATION  AND INSERTION STENT URETERAL;  Surgeon: Derek Fan MD;  Location: [address] [place] [address];  Service: Urology    AR CYSTOURETHROSCOPY,URETER CATHETER Right 1/6/2021    Procedure: Cystoscopy RIGHT stent removal;  Surgeon: Noe Jaeger MD;  [facility];  Service: Urology    SHOULDER SURGERY Right History reviewed  No pertinent family history  I have reviewed and agree with the history as documented  E-Cigarette/Vaping    E-Cigarette Use Never User      E-Cigarette/Vaping Substances     Social History     Tobacco Use    Smoking status: Current Every Day Smoker     Packs/day: 0 50     Types: Cigarettes    Smokeless tobacco: Never Used   Substance Use Topics    Alcohol use: Not Currently    Drug use: Never       Review of Systems   Constitutional: Negative for chills and fever  HENT: Negative for facial swelling and trouble swallowing  Eyes: Negative for photophobia, pain and redness  Respiratory: Negative for cough, shortness of breath and wheezing  Cardiovascular: Negative for chest pain and palpitations  Gastrointestinal: Positive for nausea and vomiting  Negative for abdominal pain, constipation and diarrhea  Genitourinary: Positive for flank pain  Negative for dysuria, hematuria and urgency  Musculoskeletal: Negative for back pain  Skin: Negative for color change and rash  Psychiatric/Behavioral: Negative for agitation and confusion  The patient is not nervous/anxious  All other systems reviewed and are negative  Physical Exam  Physical Exam  Vitals signs and nursing note reviewed  Constitutional:       Appearance: He is well-developed  HENT:      Head: Normocephalic and atraumatic  Eyes:      Pupils: Pupils are equal, round, and reactive to light  Cardiovascular:      Rate and Rhythm: Normal rate and regular rhythm  Heart sounds: Normal heart sounds  Pulmonary:      Effort: Pulmonary effort is normal       Breath sounds: Normal breath sounds  Abdominal:      General: Bowel sounds are normal  There is no distension  Palpations: Abdomen is soft  There is no mass  Tenderness: There is no abdominal tenderness  There is right CVA tenderness  There is no left CVA tenderness, guarding or rebound  Skin:     General: Skin is warm and dry  Capillary Refill: Capillary refill takes less than 2 seconds  Neurological:      General: No focal deficit present  Mental Status: He is alert and oriented to person, place, and time  Psychiatric:         Behavior: Behavior normal          Thought Content:  Thought content normal          Judgment: Judgment normal          Vital Signs  ED Triage Vitals [04/11/21 2328]   Temperature Pulse Respirations Blood Pressure SpO2   98 4 °F (36 9 °C) (!) 110 18 170/97 100 %      Temp Source Heart Rate Source Patient Position - Orthostatic VS BP Location FiO2 (%)   Tympanic Monitor Sitting Left arm --      Pain Score       9           Vitals:    04/11/21 2328 04/12/21 0256   BP: 170/97 145/74   Pulse: (!) 110 88   Patient Position - Orthostatic VS: Sitting Lying         Visual Acuity      ED Medications  Medications   ketorolac (TORADOL) injection 15 mg (15 mg Intravenous Given 4/11/21 2349)   ondansetron (ZOFRAN) injection 4 mg (4 mg Intravenous Given 4/11/21 2349)   sodium chloride 0 9 % bolus 1,000 mL (0 mL Intravenous Stopped 4/12/21 0052)   morphine (PF) 4 mg/mL injection 4 mg (4 mg Intravenous Given 4/12/21 0054)   iohexol (OMNIPAQUE) 350 MG/ML injection (SINGLE-DOSE) 100 mL (100 mL Intravenous Given 4/12/21 0128)       Diagnostic Studies  Results Reviewed     Procedure Component Value Units Date/Time    Comprehensive metabolic panel [[id number]]  (Abnormal) Collected: 04/11/21 2346    Lab Status: Final result Specimen: Blood from Arm, Left Updated: 04/12/21 0010     Sodium 140 mmol/L      Potassium 3 4 mmol/L      Chloride 103 mmol/L      CO2 27 mmol/L      ANION GAP 10 mmol/L      BUN 14 mg/dL      Creatinine 1 21 mg/dL      Glucose 92 mg/dL      Calcium 9 2 mg/dL      AST 20 U/L      ALT 34 U/L      Alkaline Phosphatase 84 U/L      Total Protein 8 1 g/dL      Albumin 4 4 g/dL      Total Bilirubin 0 45 mg/dL      eGFR 89 ml/min/1 73sq m     Narrative:      [place] guidelines for Chronic Kidney Disease (CKD):     Stage 1 with normal or high GFR (GFR > 90 mL/min/1 73 square meters)    Stage 2 Mild CKD (GFR = 60-89 mL/min/1 73 square meters)    Stage 3A Moderate CKD (GFR = 45-59 mL/min/1 73 square meters)    Stage 3B Moderate CKD (GFR = 30-44 mL/min/1 73 square meters)    Stage 4 Severe CKD (GFR = 15-29 mL/min/1 73 square meters)    Stage 5 End Stage CKD (GFR <15 mL/min/1 73 square meters)  Note: GFR calculation is accurate only with a steady state creatinine    UA w Reflex to Microscopic w Reflex to Culture [[id number]]  (Abnormal) Collected: 04/11/21 2350    Lab Status: Final result Specimen: Urine, Clean Catch Updated: 04/11/21 2358     Color, UA Yellow     Clarity, UA Clear     Specific Gravity, UA >=1 030     pH, UA 5 0     Leukocytes, UA Negative     Nitrite, UA Negative     Protein, UA Negative mg/dl      Glucose, UA Negative mg/dl      Ketones, UA Trace mg/dl      Urobilinogen, UA 0 2 E U /dl      Bilirubin, UA Negative     Blood, UA Negative    CBC and differential [[id number]]  (Abnormal) Collected: 04/11/21 2346    Lab Status: Final result Specimen: Blood from Arm, Left Updated: 04/11/21 2354     WBC 12 83 Thousand/uL      RBC 4 83 Million/uL      Hemoglobin 14 4 g/dL      Hematocrit 44 0 %      MCV 91 fL      MCH 29 8 pg      MCHC 32 7 g/dL      RDW 12 0 %      MPV 10 1 fL      Platelets 429 Thousands/uL      nRBC 0 /100 WBCs      Neutrophils Relative 55 %      Immat GRANS % 0 %      Lymphocytes Relative 35 %      Monocytes Relative 9 %      Eosinophils Relative 1 %      Basophils Relative 0 %      Neutrophils Absolute 7 03 Thousands/µL      Immature Grans Absolute 0 03 Thousand/uL      Lymphocytes Absolute 4 42 Thousands/µL      Monocytes Absolute 1 13 Thousand/µL      Eosinophils Absolute 0 18 Thousand/µL      Basophils Absolute 0 04 Thousands/µL                  CT abdomen pelvis with contrast   Final Result by Jesús Chang DO (04/12 0142)      No acute findings Workstation performed: [id number]         CT renal stone study abdomen pelvis without contrast   Final Result by Nikolas Borrero MD (04/12 0031)      No evidence of stone or hydronephrosis             Workstation performed: [id number]                    Procedures  Procedures         ED Course                                           MDM  Number of Diagnoses or Management Options  Right flank pain: new and requires workup  Urinary retention with incomplete bladder emptying: new and requires workup  Diagnosis management comments: Pt in the ER with right flank pain, concerning for a kidney stone  CT abd/pelvis without contrast reviewed and neg for acute pathology  Pt reevaluated, and now localizes pain to RLQ  Concern for acute appy, CT repeated with IV contrast, and neg  Pain treated with Toradol and morphine, with improvement  Pt with post void residual of approx 300ml  Will place a ferrara catheter  Pt will f/u with his urologist for discontinuation  Amount and/or Complexity of Data Reviewed  Clinical lab tests: ordered and reviewed  Tests in the radiology section of CPT®: ordered and reviewed    Risk of Complications, Morbidity, and/or Mortality  Presenting problems: high  Diagnostic procedures: high  Management options: high    Patient Progress  Patient progress: improved      Disposition  Final diagnoses:   Right flank pain   Urinary retention with incomplete bladder emptying     Time reflects when diagnosis was documented in both MDM as applicable and the Disposition within this note     Time User Action Codes Description Comment    4/12/2021  1:47 AM Agnieszka KIM Add [R10 9] Right flank pain     4/12/2021  2:05 AM Agnieszka Hoyt [R33 9] Urinary retention with incomplete bladder emptying       ED Disposition     ED Disposition Condition Date/Time Comment    Discharge Stable Mon Apr 12, 2021  1:47 AM Chelo Morales discharge to home/self care              Follow-up Information Follow up With Specialties Details Why Contact Info    Wilber Livingston MD Urology Schedule an appointment as soon as possible for a visit in 2 days for follow up [address]  [phone]            Discharge Medication List as of 4/12/2021  2:06 AM      CONTINUE these medications which have NOT CHANGED    Details   ondansetron (ZOFRAN-ODT) 4 mg disintegrating tablet Take 1 tablet (4 mg total) by mouth every 6 (six) hours as needed for nausea or vomiting for up to 3 days, Starting Tue 12/15/2020, Until Sun 4/11/2021, Print      oxyCODONE (ROXICODONE) 5 mg immediate release tablet Take 1 tablet (5 mg total) by mouth every 4 (four) hours as needed for moderate pain for up to 20 dosesMax Daily Amount: 30 mg, Starting Wed 12/2/2020, Normal      albuterol (PROVENTIL HFA,VENTOLIN HFA) 90 mcg/act inhaler Inhale 2 puffs every 4 (four) hours as needed, Historical Med      docusate sodium (COLACE) 100 mg capsule Take 1 capsule (100 mg total) by mouth every 12 (twelve) hours, Starting Mon 1/4/2021, Normal      lidocaine (XYLOCAINE) 2 % topical gel Apply topically 3 (three) times a day, Starting Thu 1/7/2021, Normal      naproxen (NAPROSYN) 500 mg tablet Take 1 tablet (500 mg total) by mouth 2 (two) times a day with meals, Starting Mon 1/4/2021, Normal      nicotine (NICODERM CQ) 14 mg/24hr TD 24 hr patch Place 1 patch on the skin daily, Starting Thu 12/3/2020, Normal      oxybutynin (DITROPAN) 5 mg tablet Take 1 tablet (5 mg total) by mouth 4 (four) times a day, Starting Thu 1/7/2021, No Print      phenazopyridine (PYRIDIUM) 200 mg tablet Take 1 tablet (200 mg total) by mouth 3 (three) times a day, Starting Mon 1/4/2021, Normal      polyethylene glycol (GLYCOLAX) 17 GM/SCOOP powder Take 17 g by mouth daily, Starting Mon 1/4/2021, Until Wed 2/3/2021, Normal      tamsulosin (FLOMAX) 0 4 mg Take 1 capsule (0 4 mg total) by mouth daily with dinner for 14 days, Starting Mon 1/4/2021, Until Mon 1/18/2021, Normal           No discharge procedures on file      PDMP Review     None          ED Provider  Electronically Signed by           Stephanie Turner DO  04/13/21 0025

## 2021-04-12 NOTE — DISCHARGE INSTRUCTIONS
Return to the ER for further concerns or worsening symptoms  Follow up with your primary care physician and urologist in 1-2 days  Keep ferrara catheter in place until you are evaluated by your urologist

## 2021-04-12 NOTE — ED NOTES
Pt rang call bell, informed RN pain is not feeling any better after medications and scan  Requesting more pain meds at this time  Provider made aware       Nirmal Stern RN  04/12/21 0025

## 2023-12-21 ENCOUNTER — COSMETIC (OUTPATIENT)
Age: 38
End: 2023-12-21

## 2023-12-21 ENCOUNTER — OFFICE VISIT (OUTPATIENT)
Age: 38
End: 2023-12-21

## 2023-12-21 VITALS — TEMPERATURE: 97.9 F | HEIGHT: 67 IN | BODY MASS INDEX: 39.24 KG/M2 | WEIGHT: 250 LBS

## 2023-12-21 DIAGNOSIS — L72.0 EPIDERMAL CYST: Primary | ICD-10-CM

## 2023-12-21 DIAGNOSIS — L82.1 SEBORRHEIC KERATOSIS: ICD-10-CM

## 2023-12-21 DIAGNOSIS — L73.8 PITYROSPORUM FOLLICULITIS: ICD-10-CM

## 2023-12-21 DIAGNOSIS — L91.8 SKIN TAG: ICD-10-CM

## 2023-12-21 DIAGNOSIS — Z41.1 ENCOUNTER FOR COSMETIC PROCEDURE: Primary | ICD-10-CM

## 2023-12-21 RX ORDER — KETOCONAZOLE 20 MG/ML
SHAMPOO TOPICAL
Qty: 120 ML | Refills: 3 | Status: SHIPPED | OUTPATIENT
Start: 2023-12-21

## 2023-12-21 NOTE — PROGRESS NOTES
COSMETIC VISIT      PROCEDURE:  DESTRUCTION OF ACROCHORDONS    We again discussed methods of removal including that any procedural treatment may not be covered by insurance and would then be the patient's responsibility:       Location: bilateral axilla     Description: skin colored papules     Number of Lesions Treated: 3     Treatment of lesions chosen: (excision  with scissors, electrosurgery, cryotherapy, shave removal)     Anesthesia: (1% lidocaine with epinephrine,  ELIER-Max, none)     Postop care reviewed and discussed: it was recommended to keep area clean with soap and water and keep area clean    Scribe Attestation    I,:  Minerva Sutton am acting as a scribe while in the presence of the attending physician.:       I,:  Kayleen Valerio MD personally performed the services described in this documentation    as scribed in my presence.:        DO NO BILL INSURANCE. PATIENT PAID AT TIME OF VISIT.

## 2023-12-21 NOTE — PATIENT INSTRUCTIONS
PITYROSPORUM FOLLICULITIS    Assessment and Plan:  Based on a thorough discussion of this condition and the management approach to it (including a comprehensive discussion of the known risks, side effects and potential benefits of treatment), the patient (family) agrees to implement the following specific plan:  Ketoconazole 2% shampoo apply topically to the body let it stay on the skin for 5 minutes then rinse off completely daily for 2 weeks straight.    What is pityriasis folliculitis?    Pityrosporum folliculitis, is an infection of hair follicles caused by malassezia yeasts. There are multiple malassezia species that are normal inhabitants of human skin They typically only cause disease under specific conditions.    Malassezia yeasts have been linked to a number of skin diseases including  seborrheic dermatitis, folliculitis, confluent and reticulated papillomatosis and pityriasis versicolor     Pityrosporum folliculitis is most commonly seen in adolescent and young adult males living in humid climates. Other risk factors include:  High sebum production   Hyperhidrosis (excessive sweating)   Use of emollients and sunscreens  Antibiotic use  Oral steroids such as prednisone (steroid acne)  Immunosuppression    What are the symptoms of pityrosporum folliculitis?    Pityrosporum folliculitis presents as small uniform bumps on the forehead, chin, neck, trunk and outer aspect of the upper limbs. They may be itchy and/or filled with pus.    How do we diagnose pityrosporum folliculitis?    Your doctor can usually diagnose pityrosporum folliculitis by looking at it. Laboratory investigations may also be performed.  Potassium hydroxide preparation of skin scrapings to view yeasts under microscopy  Cultures are not routinely done, as malassezia species typically require special media for growth.  Pityrosporum folliculitis may also be suspected by finding organisms within the hair follicles on skin biopsy.    How do we  treat pityrosporum folliculitis?    It is important to address any predisposing factors at the outset, as pityrosporum folliculitis has a tendency to recur.    Oral treatment is recommended over topical agents for efficacy,  with Fluconazole being used more commonly than itraconazole due to its superior side effect profile.     Topical agents (eg, selenium sulfide shampoo, econazole solution) may also be used for those who are unwilling or unable to tolerate oral treatments.     There is some evidence to suggest that isotrentinoin and photodynamic therapy may have some success.     Recurrence is common, even after successful treatment, so long-term preventative measures with topical agents may be considered in those at high-risk or with multiple recurrences. Periodic re-evaluation of predisposing factors is recommended.        EPIDERMAL INCLUSION CYST    Assessment and Plan:  Based on a thorough discussion of this condition and the management approach to it (including a comprehensive discussion of the known risks, side effects and potential benefits of treatment), the patient (family) agrees to implement the following specific plan:  Reassured benign.  Can be removed it they become bothersome.    What are epidermal inclusion cysts?  Epidermal inclusion cysts are the most common, benign cutaneous cysts. There are many different names for epidermal inclusion cysts, including epidermoid cyst, epidermal cyst, infundibular cyst, inclusion cyst, and keratin cyst. These cysts can occur anywhere on the body and typically present as nodules directly underneath the skin. There is often a visible pore or opening in the center. The cysts are freely moveable and can range from a few millimeters to several centimeters in diameter. The center of epidermoid cysts almost always contains keratin, which has a cheesy appearance, and not sebum. They also do not originate from sebaceous glands. Therefore, epidermal inclusion cysts are  not the same as sebaceous cysts.    Cysts may remain stable or progressively enlarge over time. There are no reliable predictive factors to tell if an epidermal inclusion cyst will enlarge, become inflamed, or remain quiescent. Infected cysts tend to become larger, turn red, and are more noticeable to the patient. There may be accompanying pain and discomfort.     What causes epidermal inclusion cysts?  Epidermal inclusion cysts often appear out of the blue and are not contagious. They are due to a proliferation of epidermal cells within the dermis and are more common in men than women. They occur more frequently in patients in their 20s to 40s. Epidermal inclusion cysts by themselves are usually not inherited, but they can be hereditary in rare syndromes such as Lassiter syndrome, nodular elastosis with cysts and comedones (Favre-Racouchot syndrome), and basal cell nevus syndrome (Gorlin syndrome). Elderly patients with chronic sun-damaged skin areas have a higher likelihood of developing epidermoid cysts. They often occur in areas where hair follicles have been inflamed or repeatedly irritated are more frequent in patients with acne vulgaris. In the  period, they are called milia.     Patients on BRAF inhibitors such as imiquimod and cyclosporine have a higher incidence of epidermoid cysts of the face.    How do we diagnose an epidermal inclusion cyst?  Epidermoid inclusion cysts are often diagnosed by history and physical exam. There is usually no need for biopsy prior to removal.  Radiographic and laboratory exams, such as ultrasound studies, are unnecessary and not typically ordered unless the practitioner suspects a genetic condition.    What is the treatment for an epidermal inclusion cyst?  Inflamed, uninfected epidermal inclusion cysts rarely resolve spontaneously without therapy or surgical intervention. Treatment is not emergent unless desired by the patient.     Definitive treatment is via  "surgical excision with walls intact. This method will prevent recurrence. This is best done when the cyst is not inflamed, to decrease the probability of rupture during surgery.   A local anesthetic will be injected around the cyst  A small incision is made in the skin overlying the cyst, and contents are expressed  The incision is repaired with sutures    Another option is to use a 4mm punch biopsy with cyst extraction through the defect.    Incision and drainage is often needed if the cyst is infected or inflamed. If there is surrounding cellulitis, oral antibiotic therapy may be necessary. The common agents used target methicillin sensitive Staphylococcal aureus and methicillin resistant S aureus in areas of high prevalence.        ACROCHORDON (\"SKIN TAG\")    Assessment and Plan:  Based on a thorough discussion of this condition and the management approach to it (including a comprehensive discussion of the known risks, side effects and potential benefits of treatment), the patient (family) agrees to implement the following specific plan:  Reassured benign.  If interested in removal insurance does not cover to removal we charge a fee of $20.00 to removal  Verbal consent given  Separate encounter created for the cosmetic removal of the skin tag.    Skin tags are common, soft, harmless skin lesions that are also called, in the appropriate settings, papillomas, fibroepithelial polyps, and soft fibromas.  They are made up of loosely arranged collagen fibers and blood vessels surrounded by a thickened or thinned-out epidermis.    Skin tags tend to develop in both men and women as we grow older.  They are usually found on the skin folds (neck, armpits, groin).  It is not known what specifically causes skin tags.  Certain factors, though, do appear to play a role:  Chaffing and irritation from skin rubbing together  High levels of growth factors (as seen, for example, in pregnancy or in acromegaly/gigantism)  Insulin " resistance  Human papillomavirus (wart virus)    We discussed that most skin tags do not need to be treated unless they are specifically causing the patient physical distress or limitation or pose a risk for a larger problem such as an infection that forms secondary to excoriation or chronic irritation.    We had a thorough discussion of treatment options and specific risks (including that any procedural treatment may not be covered by insurance and would then be the patient's responsibility) and benefits/alternatives including but not limited to the following:  Cryotherapy (freezing)  Shave removal  Surgical excision (snip excision with scissors)  Electrosurgery  Ligation (we do not do this procedure and counseled against it due to risk of tissue necrosis and infection)       SEBORRHEIC KERATOSIS; NON-INFLAMED  Assessment and Plan:  Based on a thorough discussion of this condition and the management approach to it (including a comprehensive discussion of the known risks, side effects and potential benefits of treatment), the patient (family) agrees to implement the following specific plan:  Reassured benign.  Recommend treatment by our cosmetic dermatologist.  Patient is aware that there is a $150.00 non-refundable consultation fee at time of visit.    Seborrheic Keratosis  A seborrheic keratosis is a harmless warty spot that appears during adult life as a common sign of skin aging.  Seborrheic keratoses can arise on any area of skin, covered or uncovered, with the usual exception of the palms and soles. They do not arise from mucous membranes. Seborrheic keratoses can have highly variable appearance.      Seborrheic keratoses are extremely common. It has been estimated that over 90% of adults over the age of 60 years have one or more of them. They occur in males and females of all races, typically beginning to erupt in the 30s or 40s. They are uncommon under the age of 20 years.  The precise cause of seborrhoeic  "keratoses is not known.  Seborrhoeic keratoses are considered degenerative in nature. As time goes by, seborrheic keratoses tend to become more numerous. Some people inherit a tendency to develop a very large number of them; some people may have hundreds of them.    The name \"seborrheic keratosis\" is misleading, because these lesions are not limited to a seborrhoeic distribution (scalp, mid-face, chest, upper back), nor are they formed from sebaceous glands, nor are they associated with sebum -- which is greasy.  Seborrheic keratosis may also be called \"SK,\" \"Seb K,\" \"basal cell papilloma,\" \"senile wart,\" or \"barnacle.\"      Researchers have noted:  Eruptive seborrhoeic keratoses can follow sunburn or dermatitis  Skin friction may be the reason they appear in body folds  Viral cause (e.g., human papillomavirus) seems unlikely  Stable and clonal mutations or activation of FRFR3, PIK3CA, JANET, AKT1 and EGFR genes are found in seborrhoeic keratoses  Seborrhoeic keratosis can arise from solar lentigo  FRFR3 mutations also arise in solar lentigines. These mutations are associated with increased age and location on the head and neck, suggesting a role of ultraviolet radiation in these lesions  Seborrheic keratoses do not harbour tumour suppressor gene mutations  Epidermal growth factor receptor inhibitors, which are used to treat some cancers, often result in an increase in verrucal (warty) keratoses.    There is no easy way to remove multiple lesions on a single occasion.  Unless a specific lesion is \"inflamed\" and is causing pain or stinging/burning or is bleeding, most insurance companies do not authorize treatment.   "

## 2023-12-21 NOTE — PROGRESS NOTES
"[facility] Note     Patient Name: Aldhuhtracie Bae  Encounter Date: 12/21/23     Have you been cared for by a [facility] Dermatologist in the last 3 years and, if so, which description applies to you?    NO.   I am considered a \"new\" patient and must complete all patient intake questions. I am MALE/not capable of bearing children.    REVIEW OF SYSTEMS:  Have you recently had or currently have any of the following? Recent fever or chills? No  Any non-healing wound? No   PAST MEDICAL HISTORY:  Have you personally ever had or currently have any of the following?  If \"YES,\" then please provide more detail. Skin cancer (such as Melanoma, Basal Cell Carcinoma, Squamous Cell Carcinoma?  No  Tuberculosis, HIV/AIDS, Hepatitis B or C: No  Radiation Treatment No   HISTORY OF IMMUNOSUPPRESSION:   Do you have a history of any of the following:  Systemic Immunosuppression such as Diabetes, Biologic or Immunotherapy, Chemotherapy, Organ Transplantation, Bone Marrow Transplantation?  YES, biologics patient can't recall he was given when he was in [facility] [place] [facility]     Answering \"YES\" requires the addition of the dotphrase \"IMMUNOSUPPRESSED\" as the first diagnosis of the patient's visit.   FAMILY HISTORY:  Any \"first degree relatives\" (parent, brother, sister, or child) with the following?    Skin Cancer, Pancreatic or Other Cancer? No   PATIENT EXPERIENCE:    Do you want the Dermatologist to perform a COMPLETE skin exam today including a clinical examination under the \"bra and underwear\" areas?  NO  If necessary, do we have your permission to call and leave a detailed message on your Preferred Phone number that includes your specific medical information?  Yes      Allergies   Allergen Reactions   • Latex    • Levofloxacin    • Penicillins    • Rocephin [Ceftriaxone]    • Shellfish Allergy - Food Allergy Other (See Comments)   • Shellfish-Derived Products - Food Allergy    • Tramadol Other (See Comments)     " Increases urinary retention   • Other Rash     coconut      Current Outpatient Medications:   •  albuterol (PROVENTIL HFA,VENTOLIN HFA) 90 mcg/act inhaler, Inhale 2 puffs every 4 (four) hours as needed, Disp: , Rfl:   •  docusate sodium (COLACE) 100 mg capsule, Take 1 capsule (100 mg total) by mouth every 12 (twelve) hours, Disp: 60 capsule, Rfl: 0  •  ketoconazole (NIZORAL) 2 % shampoo, Apply topically to the body, chest, back, arms, shoulders let it stay on the skin for 5 minutes then rinse off completely daily for 2 weeks straight., Disp: 120 mL, Rfl: 3  •  lidocaine (XYLOCAINE) 2 % topical gel, Apply topically 3 (three) times a day (Patient not taking: Reported on 12/21/2023), Disp: 30 mL, Rfl: 0  •  naproxen (NAPROSYN) 500 mg tablet, Take 1 tablet (500 mg total) by mouth 2 (two) times a day with meals (Patient not taking: Reported on 12/21/2023), Disp: 30 tablet, Rfl: 0  •  nicotine (NICODERM CQ) 14 mg/24hr TD 24 hr patch, Place 1 patch on the skin daily (Patient not taking: Reported on 12/21/2023), Disp: 28 patch, Rfl: 0  •  ondansetron (ZOFRAN-ODT) 4 mg disintegrating tablet, Take 1 tablet (4 mg total) by mouth every 6 (six) hours as needed for nausea or vomiting for up to 3 days, Disp: 12 tablet, Rfl: 0  •  oxybutynin (DITROPAN) 5 mg tablet, Take 1 tablet (5 mg total) by mouth 4 (four) times a day (Patient not taking: Reported on 12/21/2023), Disp: , Rfl: 0  •  oxyCODONE (ROXICODONE) 5 mg immediate release tablet, Take 1 tablet (5 mg total) by mouth every 4 (four) hours as needed for moderate pain for up to 20 dosesMax Daily Amount: 30 mg (Patient not taking: Reported on 12/21/2023), Disp: 20 tablet, Rfl: 0  •  phenazopyridine (PYRIDIUM) 200 mg tablet, Take 1 tablet (200 mg total) by mouth 3 (three) times a day (Patient not taking: Reported on 12/21/2023), Disp: 6 tablet, Rfl: 0  •  polyethylene glycol (GLYCOLAX) 17 GM/SCOOP powder, Take 17 g by mouth daily, Disp: 510 g, Rfl: 0  •  tamsulosin (FLOMAX) 0.4  mg, Take 1 capsule (0.4 mg total) by mouth daily with dinner for 14 days, Disp: 14 capsule, Rfl: 0          Whom besides the patient is providing clinical information about today's encounter?   NO ADDITIONAL HISTORIAN (patient alone provided history)    Physical Exam and Assessment/Plan by Diagnosis:    PITYROSPORUM FOLLICULITIS    Physical Exam:  Anatomic Location Affected:  chest, back, shoulders, arms  Morphological Description:  small red to pink follicular papules some pustules some keratotic papules  Pertinent Positives:  Pertinent Negatives:    Additional History of Present Condition:  onset 2 months itchy on the lower back. Got worst while being on steroids for asthma     Assessment and Plan:  Based on a thorough discussion of this condition and the management approach to it (including a comprehensive discussion of the known risks, side effects and potential benefits of treatment), the patient (family) agrees to implement the following specific plan:  Ketoconazole 2% shampoo apply topically to the body let it stay on the skin for 5 minutes then rinse off completely daily for 2 weeks straight.    What is pityriasis folliculitis?    Pityrosporum folliculitis, is an infection of hair follicles caused by malassezia yeasts. There are multiple malassezia species that are normal inhabitants of human skin They typically only cause disease under specific conditions.    Malassezia yeasts have been linked to a number of skin diseases including  seborrheic dermatitis, folliculitis, confluent and reticulated papillomatosis and pityriasis versicolor     Pityrosporum folliculitis is most commonly seen in adolescent and young adult males living in humid climates. Other risk factors include:  High sebum production   Hyperhidrosis (excessive sweating)   Use of emollients and sunscreens  Antibiotic use  Oral steroids such as prednisone (steroid acne)  Immunosuppression    What are the symptoms of pityrosporum  folliculitis?    Pityrosporum folliculitis presents as small uniform bumps on the forehead, chin, neck, trunk and outer aspect of the upper limbs. They may be itchy and/or filled with pus.    How do we diagnose pityrosporum folliculitis?    Your doctor can usually diagnose pityrosporum folliculitis by looking at it. Laboratory investigations may also be performed.  Potassium hydroxide preparation of skin scrapings to view yeasts under microscopy  Cultures are not routinely done, as malassezia species typically require special media for growth.  Pityrosporum folliculitis may also be suspected by finding organisms within the hair follicles on skin biopsy.    How do we treat pityrosporum folliculitis?    It is important to address any predisposing factors at the outset, as pityrosporum folliculitis has a tendency to recur.    Oral treatment is recommended over topical agents for efficacy,  with Fluconazole being used more commonly than itraconazole due to its superior side effect profile.     Topical agents (eg, selenium sulfide shampoo, econazole solution) may also be used for those who are unwilling or unable to tolerate oral treatments.     There is some evidence to suggest that isotrentinoin and photodynamic therapy may have some success.     Recurrence is common, even after successful treatment, so long-term preventative measures with topical agents may be considered in those at high-risk or with multiple recurrences. Periodic re-evaluation of predisposing factors is recommended.        EPIDERMAL INCLUSION CYST    Physical Exam:  Anatomic Location Affected:  left posterior auricular  Morphological Description:  5 mm subcutaneous nodule with central por  Pertinent Positives:  Pertinent Negatives:    Additional History of Present Condition:  at office visit    Assessment and Plan:  Based on a thorough discussion of this condition and the management approach to it (including a comprehensive discussion of the known  risks, side effects and potential benefits of treatment), the patient (family) agrees to implement the following specific plan:  Reassured benign.  Can be removed it they become bothersome.    What are epidermal inclusion cysts?  Epidermal inclusion cysts are the most common, benign cutaneous cysts. There are many different names for epidermal inclusion cysts, including epidermoid cyst, epidermal cyst, infundibular cyst, inclusion cyst, and keratin cyst. These cysts can occur anywhere on the body and typically present as nodules directly underneath the skin. There is often a visible pore or opening in the center. The cysts are freely moveable and can range from a few millimeters to several centimeters in diameter. The center of epidermoid cysts almost always contains keratin, which has a cheesy appearance, and not sebum. They also do not originate from sebaceous glands. Therefore, epidermal inclusion cysts are not the same as sebaceous cysts.    Cysts may remain stable or progressively enlarge over time. There are no reliable predictive factors to tell if an epidermal inclusion cyst will enlarge, become inflamed, or remain quiescent. Infected cysts tend to become larger, turn red, and are more noticeable to the patient. There may be accompanying pain and discomfort.     What causes epidermal inclusion cysts?  Epidermal inclusion cysts often appear out of the blue and are not contagious. They are due to a proliferation of epidermal cells within the dermis and are more common in men than women. They occur more frequently in patients in their 20s to 40s. Epidermal inclusion cysts by themselves are usually not inherited, but they can be hereditary in rare syndromes such as Lassiter syndrome, nodular elastosis with cysts and comedones (Favre-Racouchot syndrome), and basal cell nevus syndrome (Gorlin syndrome). Elderly patients with chronic sun-damaged skin areas have a higher likelihood of developing epidermoid cysts.  "They often occur in areas where hair follicles have been inflamed or repeatedly irritated are more frequent in patients with acne vulgaris. In the  period, they are called milia.     Patients on BRAF inhibitors such as imiquimod and cyclosporine have a higher incidence of epidermoid cysts of the face.    How do we diagnose an epidermal inclusion cyst?  Epidermoid inclusion cysts are often diagnosed by history and physical exam. There is usually no need for biopsy prior to removal.  Radiographic and laboratory exams, such as ultrasound studies, are unnecessary and not typically ordered unless the practitioner suspects a genetic condition.    What is the treatment for an epidermal inclusion cyst?  Inflamed, uninfected epidermal inclusion cysts rarely resolve spontaneously without therapy or surgical intervention. Treatment is not emergent unless desired by the patient.     Definitive treatment is via surgical excision with walls intact. This method will prevent recurrence. This is best done when the cyst is not inflamed, to decrease the probability of rupture during surgery.   A local anesthetic will be injected around the cyst  A small incision is made in the skin overlying the cyst, and contents are expressed  The incision is repaired with sutures    Another option is to use a 4mm punch biopsy with cyst extraction through the defect.    Incision and drainage is often needed if the cyst is infected or inflamed. If there is surrounding cellulitis, oral antibiotic therapy may be necessary. The common agents used target methicillin sensitive Staphylococcal aureus and methicillin resistant S aureus in areas of high prevalence.        ACROCHORDON (\"SKIN TAG\")    Physical Exam:  Anatomic Location Affected:  R & L axilla  Morphological Description:  skin colored papules  Pertinent Positives:  Pertinent Negatives:    Additional History of Present Condition:  not contributory    Assessment and Plan:  Based on a " thorough discussion of this condition and the management approach to it (including a comprehensive discussion of the known risks, side effects and potential benefits of treatment), the patient (family) agrees to implement the following specific plan:  Reassured benign.  If interested in removal insurance does not cover to removal we charge a fee of $20.00 to removal  Verbal consent given  Separate encounter created for the cosmetic removal of the skin tag.    Skin tags are common, soft, harmless skin lesions that are also called, in the appropriate settings, papillomas, fibroepithelial polyps, and soft fibromas.  They are made up of loosely arranged collagen fibers and blood vessels surrounded by a thickened or thinned-out epidermis.    Skin tags tend to develop in both men and women as we grow older.  They are usually found on the skin folds (neck, armpits, groin).  It is not known what specifically causes skin tags.  Certain factors, though, do appear to play a role:  Chaffing and irritation from skin rubbing together  High levels of growth factors (as seen, for example, in pregnancy or in acromegaly/gigantism)  Insulin resistance  Human papillomavirus (wart virus)    We discussed that most skin tags do not need to be treated unless they are specifically causing the patient physical distress or limitation or pose a risk for a larger problem such as an infection that forms secondary to excoriation or chronic irritation.    We had a thorough discussion of treatment options and specific risks (including that any procedural treatment may not be covered by insurance and would then be the patient's responsibility) and benefits/alternatives including but not limited to the following:  Cryotherapy (freezing)  Shave removal  Surgical excision (snip excision with scissors)  Electrosurgery  Ligation (we do not do this procedure and counseled against it due to risk of tissue necrosis and infection)       SEBORRHEIC KERATOSIS;  "NON-INFLAMED    Physical Exam:  Anatomic Location Affected:  forehead  Morphological Description:  Flat and raised, waxy, smooth to warty textured, yellow to brownish-grey to dark brown to blackish, discrete, \"stuck-on\" appearing papules.  Pertinent Positives:  Pertinent Negatives:    Additional History of Present Condition:  Patient reports new bumps on the skin.  Denies itch, burn, pain, bleeding or ulceration.  Present constantly; nothing seems to make it worse or better.  No prior treatment.      Assessment and Plan:  Based on a thorough discussion of this condition and the management approach to it (including a comprehensive discussion of the known risks, side effects and potential benefits of treatment), the patient (family) agrees to implement the following specific plan:  Reassured benign.  Recommend treatment by our cosmetic dermatologist because of the risk of post inflammatory hypo and/or hyper pigmentation  Patient is aware that there is a $150.00 non-refundable consultation fee at time of visit.    Seborrheic Keratosis  A seborrheic keratosis is a harmless warty spot that appears during adult life as a common sign of skin aging.  Seborrheic keratoses can arise on any area of skin, covered or uncovered, with the usual exception of the palms and soles. They do not arise from mucous membranes. Seborrheic keratoses can have highly variable appearance.      Seborrheic keratoses are extremely common. It has been estimated that over 90% of adults over the age of 60 years have one or more of them. They occur in males and females of all races, typically beginning to erupt in the 30s or 40s. They are uncommon under the age of 20 years.  The precise cause of seborrhoeic keratoses is not known.  Seborrhoeic keratoses are considered degenerative in nature. As time goes by, seborrheic keratoses tend to become more numerous. Some people inherit a tendency to develop a very large number of them; some people may have " "hundreds of them.    The name \"seborrheic keratosis\" is misleading, because these lesions are not limited to a seborrhoeic distribution (scalp, mid-face, chest, upper back), nor are they formed from sebaceous glands, nor are they associated with sebum -- which is greasy.  Seborrheic keratosis may also be called \"SK,\" \"Seb K,\" \"basal cell papilloma,\" \"senile wart,\" or \"barnacle.\"      Researchers have noted:  Eruptive seborrhoeic keratoses can follow sunburn or dermatitis  Skin friction may be the reason they appear in body folds  Viral cause (e.g., human papillomavirus) seems unlikely  Stable and clonal mutations or activation of FRFR3, PIK3CA, JANET, AKT1 and EGFR genes are found in seborrhoeic keratoses  Seborrhoeic keratosis can arise from solar lentigo  FRFR3 mutations also arise in solar lentigines. These mutations are associated with increased age and location on the head and neck, suggesting a role of ultraviolet radiation in these lesions  Seborrheic keratoses do not harbour tumour suppressor gene mutations  Epidermal growth factor receptor inhibitors, which are used to treat some cancers, often result in an increase in verrucal (warty) keratoses.    There is no easy way to remove multiple lesions on a single occasion.  Unless a specific lesion is \"inflamed\" and is causing pain or stinging/burning or is bleeding, most insurance companies do not authorize treatment.       Scribe Attestation    I,:  Minerva Sutton am acting as a scribe while in the presence of the attending physician.:       I,:  Kayleen Valerio MD personally performed the services described in this documentation    as scribed in my presence.:          "

## (undated) DEVICE — UROCATCH BAG

## (undated) DEVICE — RADIOLOGY STERILE LABELS: Brand: CENTURION

## (undated) DEVICE — SPECIMEN CONTAINER STERILE PEEL PACK

## (undated) DEVICE — GUIDEWIRE STRGHT TIP 0.035 IN  SOLO PLUS

## (undated) DEVICE — PACK TUR

## (undated) DEVICE — INVIEW CLEAR LEGGINGS: Brand: CONVERTORS

## (undated) DEVICE — POUCH UR CATCHER STERILE

## (undated) DEVICE — GUIDEWIRE STRGHT TIP 0.038 IN SOLO PLUS

## (undated) DEVICE — Device

## (undated) DEVICE — SPONGE STICK WITH PVP-I: Brand: KENDALL

## (undated) DEVICE — CYSTOSCOPY PACK: Brand: CONVERTORS

## (undated) DEVICE — NGAGE, NITINOL STONE EXTRACTOR: Brand: NGAGE

## (undated) DEVICE — LUBRICANT SURGILUBE TUBE 4 OZ  FLIP TOP

## (undated) DEVICE — STERILE SURGICAL LUBRICANT,  TUBE: Brand: SURGILUBE

## (undated) DEVICE — CYSTO TUBING TUR Y IRRIGATION

## (undated) DEVICE — CATH URETERAL 5FR X 70 CM FLEX TIP POLYUR BARD

## (undated) DEVICE — FABRIC REINFORCED, SURGICAL GOWN, XL: Brand: CONVERTORS

## (undated) DEVICE — GLOVE SRG BIOGEL 7.5

## (undated) DEVICE — X-RAY DETECTABLE SPONGES,16 PLY: Brand: VISTEC

## (undated) DEVICE — GLOVE SRG BIOGEL 8

## (undated) DEVICE — LASER FIBER HOLMIUM 272MICRON

## (undated) DEVICE — TUBING SUCTION 5MM X 12 FT